# Patient Record
Sex: FEMALE | Race: WHITE | NOT HISPANIC OR LATINO | Employment: FULL TIME | URBAN - METROPOLITAN AREA
[De-identification: names, ages, dates, MRNs, and addresses within clinical notes are randomized per-mention and may not be internally consistent; named-entity substitution may affect disease eponyms.]

---

## 2019-06-07 ENCOUNTER — OFFICE VISIT (OUTPATIENT)
Dept: FAMILY MEDICINE CLINIC | Facility: CLINIC | Age: 24
End: 2019-06-07

## 2019-06-07 VITALS
DIASTOLIC BLOOD PRESSURE: 60 MMHG | HEART RATE: 58 BPM | WEIGHT: 155 LBS | TEMPERATURE: 98.2 F | HEIGHT: 63 IN | SYSTOLIC BLOOD PRESSURE: 110 MMHG | BODY MASS INDEX: 27.46 KG/M2

## 2019-06-07 DIAGNOSIS — Z13.220 SCREENING FOR LIPID DISORDERS: ICD-10-CM

## 2019-06-07 DIAGNOSIS — Z13.1 SCREENING FOR DIABETES MELLITUS: ICD-10-CM

## 2019-06-07 DIAGNOSIS — B37.9 ANTIBIOTIC-INDUCED YEAST INFECTION: ICD-10-CM

## 2019-06-07 DIAGNOSIS — J06.9 UPPER RESPIRATORY TRACT INFECTION, UNSPECIFIED TYPE: ICD-10-CM

## 2019-06-07 DIAGNOSIS — Z13.29 SCREENING FOR THYROID DISORDER: ICD-10-CM

## 2019-06-07 DIAGNOSIS — T36.95XA ANTIBIOTIC-INDUCED YEAST INFECTION: ICD-10-CM

## 2019-06-07 DIAGNOSIS — J30.2 SEASONAL ALLERGIES: ICD-10-CM

## 2019-06-07 DIAGNOSIS — B97.7 HPV IN FEMALE: Primary | ICD-10-CM

## 2019-06-07 DIAGNOSIS — Z13.0 SCREENING FOR DEFICIENCY ANEMIA: ICD-10-CM

## 2019-06-07 PROCEDURE — 99204 OFFICE O/P NEW MOD 45 MIN: CPT | Performed by: NURSE PRACTITIONER

## 2019-06-07 RX ORDER — FLUCONAZOLE 150 MG/1
150 TABLET ORAL ONCE
Qty: 1 TABLET | Refills: 0 | Status: SHIPPED | OUTPATIENT
Start: 2019-06-07 | End: 2019-06-07

## 2019-06-07 RX ORDER — AZITHROMYCIN 250 MG/1
TABLET, FILM COATED ORAL
Qty: 6 TABLET | Refills: 0 | Status: SHIPPED | OUTPATIENT
Start: 2019-06-07 | End: 2019-11-04

## 2019-06-07 RX ORDER — FEXOFENADINE HCL 180 MG/1
180 TABLET ORAL DAILY
Qty: 30 TABLET | Refills: 6 | Status: SHIPPED | OUTPATIENT
Start: 2019-06-07 | End: 2019-11-04

## 2019-06-07 RX ORDER — AZELASTINE 1 MG/ML
2 SPRAY, METERED NASAL 2 TIMES DAILY
Qty: 30 ML | Refills: 11 | Status: SHIPPED | OUTPATIENT
Start: 2019-06-07 | End: 2019-11-04

## 2019-06-07 NOTE — PROGRESS NOTES
Avani Dumont presents today to establish care.    Chief Complaint   Patient presents with   • Cough     She comes in today to be seen for coughing up phlem,sore throat and ear problems   • Earache   • Sore Throat        HPI   Avnai presents to establish care. Recently got new insurance through her job.  Concerns today include cough/seasonal allergies.    cough/seasonal allergies  New.  Worsening.  Symptoms started 3 weeks ago when she was in [place] for the pinkness.  Has been worsening over the past week, now she has an earache bilateral.  Denies fevers or body aches.  Reports a productive cough with increased mucus.  Has tried Sudafed OTC but nothing else.  She does not currently take a daily allergy medication.  She does note that she has seasonal allergies since moving to [place] 5 years ago, had adenoids removed last year due to chronic sinusitis.  Was seeing an allergist but then quit going.      Neck pain  Left posterior neck pain.  Thinks this is a work-related issue.  Describes pain as sharp burning with tingling in her shoulder, worse with movement.  Has not tried any medication for this.  She believes she would like to go to the chiropractor as she has been to the chiropractor before and it helped.  Does not want any medication management at this time or any imaging.    Health Maintenance:  -Sees dentist and opthalmology regularly.  -Sexually active, no use of condoms.  -Birth control: IUD, Kyleena- placed 1 year ago  -Use of seatbelt 100% of the time  -Smoke/CO detector working in home  -Nonsmoker  -Vaccines- needs Hep A- will get at work  -Previously went to  -- will request records    PHQ-2/PHQ-9 Depression Screening 6/7/2019   Little interest or pleasure in doing things 0   Feeling down, depressed, or hopeless 0   Total Score 0       Review of Systems   Constitutional: Positive for fatigue. Negative for chills, diaphoresis and fever.   HENT: Positive for congestion, ear pain, postnasal  "drip, rhinorrhea, sinus pressure and sore throat. Negative for sneezing, tinnitus and voice change.    Eyes: Negative for blurred vision, discharge and visual disturbance.   Respiratory: Positive for cough. Negative for shortness of breath.    Cardiovascular: Negative for chest pain, palpitations and leg swelling.   Gastrointestinal: Negative for constipation, diarrhea, nausea, vomiting, GERD and indigestion.   Musculoskeletal: Negative for neck pain and neck stiffness.   Neurological: Positive for headache. Negative for dizziness and light-headedness.   Psychiatric/Behavioral: Negative for depressed mood and stress. The patient is not nervous/anxious.         Past Medical History:   Diagnosis Date   • Seasonal allergies         Past Surgical History:   Procedure Laterality Date   • ADENOIDECTOMY  2018   • TONSILLECTOMY  2010        Family History   Problem Relation Age of Onset   • Hypertension Paternal Grandfather         Social History     Socioeconomic History   • Marital status: Single     Spouse name: Not on file   • Number of children: Not on file   • Years of education: Not on file   • Highest education level: Not on file   Occupational History     Employer: [employer]     Comment: non profit; [employer]   Tobacco Use   • Smoking status: Current Some Day Smoker   • Smokeless tobacco: Never Used   • Tobacco comment: socially   Substance and Sexual Activity   • Alcohol use: Yes     Comment: socially   • Drug use: Yes     Types: Marijuana     Comment: socially   • Sexual activity: Yes     Partners: Male     Birth control/protection: IUD     Comment: Kyleena; placed 1 year ago        No current outpatient medications on file prior to visit.     No current facility-administered medications on file prior to visit.        No Known Allergies     Visit Vitals  /60   Pulse 58   Temp 98.2 °F (36.8 °C)   Ht 158.8 cm (62.5\")   Wt 70.3 kg (155 lb)   BMI 27.90 kg/m²        Physical Exam "   Constitutional: She is oriented to person, place, and time. Vital signs are normal. She appears well-developed and well-nourished.   HENT:   Head: Normocephalic.   Right Ear: Hearing, tympanic membrane, external ear and ear canal normal. Tympanic membrane is not erythematous.   Left Ear: Hearing, tympanic membrane, external ear and ear canal normal. Tympanic membrane is not erythematous.   Nose: Rhinorrhea and congestion present. Right sinus exhibits no maxillary sinus tenderness and no frontal sinus tenderness. Left sinus exhibits no maxillary sinus tenderness and no frontal sinus tenderness.   Mouth/Throat: Uvula is midline, oropharynx is clear and moist and mucous membranes are normal. No posterior oropharyngeal erythema. No tonsillar exudate.   Post-nasal drainage   Eyes: Conjunctivae and lids are normal. Right eye exhibits no discharge. Left eye exhibits no discharge.   Neck: Normal range of motion. Neck supple.   Cardiovascular: Normal rate, regular rhythm, normal heart sounds and intact distal pulses.   Pulmonary/Chest: Effort normal and breath sounds normal. No respiratory distress. She has no wheezes. She has no rhonchi. She has no rales.   Abdominal: Soft. Bowel sounds are normal.   Musculoskeletal: Normal range of motion.   Lymphadenopathy:        Head (right side): No submental, no submandibular, no tonsillar, no preauricular, no posterior auricular and no occipital adenopathy present.        Head (left side): No submental, no submandibular, no tonsillar, no preauricular, no posterior auricular and no occipital adenopathy present.     She has no cervical adenopathy.   Neurological: She is alert and oriented to person, place, and time. GCS eye subscore is 4. GCS verbal subscore is 5. GCS motor subscore is 6.   Skin: Skin is warm, dry and intact. She is not diaphoretic.   Psychiatric: She has a normal mood and affect. Her speech is normal and behavior is normal. Judgment and thought content normal.    Nursing note and vitals reviewed.       Results for orders placed or performed during the hospital encounter of 02/24/19   POC Urinalysis Dipstick, Multipro (Automated dipstick)   Result Value Ref Range    Color Straw Yellow, Straw, Dark Yellow, Aaliyah    Clarity, UA Cloudy (A) Clear    Glucose, UA Negative Negative, 1000 mg/dL (3+) mg/dL    Bilirubin Negative Negative    Ketones, UA Negative Negative    Specific Gravity  1.025 1.005 - 1.030    Blood, UA Negative Negative    pH, Urine 6.0 5.0 - 8.0    Protein, POC Negative Negative mg/dL    Urobilinogen, UA Normal Normal    Nitrite, UA Positive (A) Negative    Leukocytes Large (3+) (A) Negative        Assessment/Plan  Avani was seen today for cough, earache and sore throat.    Diagnoses and all orders for this visit:    HPV in female  -     Ambulatory Referral to Gynecology  Screening for diabetes mellitus  -     Comprehensive Metabolic Panel; Future  Screening for lipid disorders  -     Lipid Panel; Future  Screening for deficiency anemia  -     CBC & Differential; Future  Screening for thyroid disorder  -     TSH Rfx On Abnormal To Free T4; Future    Upper respiratory tract infection, unspecified type  -     azithromycin (ZITHROMAX) 250 MG tablet; Take 2 tablets the first day, then 1 tablet daily for 4 days.  -Gargle warm salt water to help with sore throat.  -Increase fluid intake.  -Take medication as prescribed.  -Perform good hand hygiene.  -Use of chloraseptic spray or throat lozenges may be used with medications.  -Return to clinic if symptoms persist or worsen.    Seasonal allergies  -     azelastine (ASTELIN) 0.1 % nasal spray; 2 sprays into the nostril(s) as directed by provider 2 (Two) Times a Day. Use in each nostril as directed  -     fexofenadine (ALLEGRA ALLERGY) 180 MG tablet; Take 1 tablet by mouth Daily.    Antibiotic-induced yeast infection  -     fluconazole (DIFLUCAN) 150 MG tablet; Take 1 tablet by mouth 1 (One) Time for 1  dose.        Return in about 6 months (around 12/7/2019) for Annual.

## 2019-11-04 ENCOUNTER — OFFICE VISIT (OUTPATIENT)
Dept: FAMILY MEDICINE CLINIC | Facility: CLINIC | Age: 24
End: 2019-11-04

## 2019-11-04 VITALS
TEMPERATURE: 98.2 F | WEIGHT: 155.4 LBS | BODY MASS INDEX: 27.54 KG/M2 | SYSTOLIC BLOOD PRESSURE: 112 MMHG | OXYGEN SATURATION: 98 % | HEIGHT: 63 IN | DIASTOLIC BLOOD PRESSURE: 80 MMHG | HEART RATE: 69 BPM

## 2019-11-04 DIAGNOSIS — R05.9 COUGH: ICD-10-CM

## 2019-11-04 DIAGNOSIS — J02.9 SORE THROAT: ICD-10-CM

## 2019-11-04 DIAGNOSIS — K12.1 ORAL ULCER: ICD-10-CM

## 2019-11-04 DIAGNOSIS — J06.9 URI, ACUTE: Primary | ICD-10-CM

## 2019-11-04 DIAGNOSIS — J04.0 LARYNGITIS: ICD-10-CM

## 2019-11-04 PROCEDURE — 99213 OFFICE O/P EST LOW 20 MIN: CPT | Performed by: NURSE PRACTITIONER

## 2019-11-04 RX ORDER — CHLORHEXIDINE GLUCONATE 0.12 MG/ML
15 RINSE ORAL 2 TIMES DAILY
Qty: 118 ML | Refills: 1 | Status: SHIPPED | OUTPATIENT
Start: 2019-11-04 | End: 2020-01-07

## 2019-11-04 RX ORDER — DEXTROMETHORPHAN HYDROBROMIDE AND PROMETHAZINE HYDROCHLORIDE 15; 6.25 MG/5ML; MG/5ML
5 SYRUP ORAL 4 TIMES DAILY PRN
Qty: 240 ML | Refills: 0 | Status: SHIPPED | OUTPATIENT
Start: 2019-11-04 | End: 2020-01-07

## 2019-11-04 RX ORDER — AZELASTINE 1 MG/ML
2 SPRAY, METERED NASAL 2 TIMES DAILY
Qty: 30 ML | Refills: 11 | Status: SHIPPED | OUTPATIENT
Start: 2019-11-04 | End: 2020-05-21

## 2019-11-04 NOTE — PROGRESS NOTES
Chief Complaint   Patient presents with   • Laryngitis   • Sore Throat      Avani presents today for loss of voice and sore throat.  [place] and was around her sister who was sick with URI.    URI    This is a new problem. The current episode started in the past 7 days. The problem has been gradually worsening. The maximum temperature recorded prior to her arrival was 100.4 - 100.9 F. The fever has been present for less than 1 day. Associated symptoms include congestion, coughing, headaches, a plugged ear sensation, rhinorrhea, sinus pain and a sore throat. Pertinent negatives include no abdominal pain, chest pain, diarrhea, ear pain, nausea, neck pain, sneezing, swollen glands, vomiting or wheezing. She has tried decongestant and NSAIDs (sudafed, ibuprofen, and vitamin pack) for the symptoms. The treatment provided mild relief.          Review of Systems   Constitutional: Positive for fatigue. Negative for chills, diaphoresis and fever.   HENT: Positive for congestion, postnasal drip, rhinorrhea, sore throat and voice change. Negative for ear pain, sinus pressure, sneezing, swollen glands and tinnitus.    Eyes: Negative for blurred vision, discharge and visual disturbance.   Respiratory: Positive for cough. Negative for chest tightness, shortness of breath and wheezing.    Cardiovascular: Negative for chest pain, palpitations and leg swelling.   Gastrointestinal: Negative for abdominal pain, constipation, diarrhea, nausea, vomiting, GERD and indigestion.   Musculoskeletal: Negative for neck pain and neck stiffness.   Neurological: Positive for headache. Negative for dizziness and light-headedness.   Psychiatric/Behavioral: Negative for depressed mood and stress. The patient is not nervous/anxious.         Current Outpatient Medications on File Prior to Visit   Medication Sig Dispense Refill   • [DISCONTINUED] azelastine (ASTELIN) 0.1 % nasal spray 2 sprays into the nostril(s) as directed by  "provider 2 (Two) Times a Day. Use in each nostril as directed 30 mL 11   • [DISCONTINUED] azithromycin (ZITHROMAX) 250 MG tablet Take 2 tablets the first day, then 1 tablet daily for 4 days. 6 tablet 0   • [DISCONTINUED] fexofenadine (ALLEGRA ALLERGY) 180 MG tablet Take 1 tablet by mouth Daily. 30 tablet 6     No current facility-administered medications on file prior to visit.        No Known Allergies    Past Medical History:   Diagnosis Date   • Seasonal allergies         Past Surgical History:   Procedure Laterality Date   • ADENOIDECTOMY  2018   • TONSILLECTOMY  2010        Family History   Problem Relation Age of Onset   • Hypertension Paternal Grandfather         Social History     Socioeconomic History   • Marital status: Single     Spouse name: Not on file   • Number of children: Not on file   • Years of education: Not on file   • Highest education level: Not on file   Occupational History     Employer: [employer]     Comment: non profit; [employer]   Tobacco Use   • Smoking status: Current Some Day Smoker   • Smokeless tobacco: Never Used   • Tobacco comment: socially   Substance and Sexual Activity   • Alcohol use: Yes     Comment: socially   • Drug use: Yes     Types: Marijuana     Comment: socially   • Sexual activity: Yes     Partners: Male     Birth control/protection: IUD     Comment: Kyleena; placed 1 year ago        Visit Vitals  /80 (BP Location: Right arm, Patient Position: Sitting, Cuff Size: Adult)   Pulse 69   Temp 98.2 °F (36.8 °C) (Temporal)   Ht 158.8 cm (62.5\")   Wt 70.5 kg (155 lb 6.4 oz)   LMP 10/26/2019   SpO2 98%   Breastfeeding? No   BMI 27.97 kg/m²        Physical Exam   Constitutional: She is oriented to person, place, and time. She appears well-developed and well-nourished.   HENT:   Head: Normocephalic.   Right Ear: Tympanic membrane, external ear and ear canal normal. Tympanic membrane is not erythematous.   Left Ear: Tympanic membrane, " external ear and ear canal normal. Tympanic membrane is not erythematous.   Nose: Rhinorrhea and congestion present. Right sinus exhibits no maxillary sinus tenderness and no frontal sinus tenderness. Left sinus exhibits no maxillary sinus tenderness and no frontal sinus tenderness.   Mouth/Throat: Oropharynx is clear and moist and mucous membranes are normal. No posterior oropharyngeal erythema. No tonsillar exudate.       Post-nasal drainage   Eyes: Conjunctivae are normal. Right eye exhibits no discharge. Left eye exhibits no discharge.   Neck: Normal range of motion. Neck supple.   Cardiovascular: Normal rate, regular rhythm and normal heart sounds.   Pulmonary/Chest: Effort normal and breath sounds normal. No respiratory distress. She has no wheezes. She has no rhonchi. She has no rales.   Lymphadenopathy:        Head (right side): No submental, no submandibular, no tonsillar, no preauricular, no posterior auricular and no occipital adenopathy present.        Head (left side): No submental, no submandibular, no tonsillar, no preauricular, no posterior auricular and no occipital adenopathy present.     She has no cervical adenopathy.   Neurological: She is alert and oriented to person, place, and time.   Skin: Skin is warm, dry and intact. She is not diaphoretic.   Psychiatric: She has a normal mood and affect. Her speech is normal and behavior is normal. Judgment and thought content normal.   Nursing note and vitals reviewed.      Results for orders placed or performed during the hospital encounter of 02/24/19   POC Urinalysis Dipstick, Multipro (Automated dipstick)   Result Value Ref Range    Color Straw Yellow, Straw, Dark Yellow, Aaliyah    Clarity, UA Cloudy (A) Clear    Glucose, UA Negative Negative, 1000 mg/dL (3+) mg/dL    Bilirubin Negative Negative    Ketones, UA Negative Negative    Specific Gravity  1.025 1.005 - 1.030    Blood, UA Negative Negative    pH, Urine 6.0 5.0 - 8.0    Protein, POC Negative  Negative mg/dL    Urobilinogen, UA Normal Normal    Nitrite, UA Positive (A) Negative    Leukocytes Large (3+) (A) Negative        Avani was seen today for laryngitis and sore throat.    Diagnoses and all orders for this visit:    URI, acute  Sore throat  Laryngitis  -Recommend OTC Claritin/Allegra/Zyrtec and Mucinex DM 24 hrs  -flonase/nasocort nasal spray  -Gargle warm salt water to help with sore throat.  -Increase fluid intake.  -Take medication as prescribed.  -Perform good hand hygiene.  -Use of chloraseptic spray or throat lozenges may be used with medications.  -Return to clinic if symptoms persist or worsen.  -     azelastine (ASTELIN) 0.1 % nasal spray; 2 sprays into the nostril(s) as directed by provider 2 (Two) Times a Day. Use in each nostril as directed.    Oral ulcer  -     chlorhexidine (PERIDEX) 0.12 % solution; Apply 15 mL to the mouth or throat 2 (Two) Times a Day.    Cough  -     promethazine-dextromethorphan (PROMETHAZINE-DM) 6.25-15 MG/5ML syrup; Take 5 mL by mouth 4 (Four) Times a Day As Needed for Cough.      If sxs persist or worsen- will send antibx.    Return if symptoms worsen or fail to improve.

## 2019-11-25 DIAGNOSIS — Z76.89 REFERRAL OF PATIENT: Primary | ICD-10-CM

## 2020-01-07 ENCOUNTER — OFFICE VISIT (OUTPATIENT)
Dept: OBSTETRICS AND GYNECOLOGY | Facility: CLINIC | Age: 25
End: 2020-01-07

## 2020-01-07 VITALS
SYSTOLIC BLOOD PRESSURE: 118 MMHG | DIASTOLIC BLOOD PRESSURE: 80 MMHG | WEIGHT: 155.6 LBS | HEIGHT: 63 IN | BODY MASS INDEX: 27.57 KG/M2

## 2020-01-07 DIAGNOSIS — B97.7 HPV (HUMAN PAPILLOMA VIRUS) INFECTION: Primary | ICD-10-CM

## 2020-01-07 DIAGNOSIS — Z30.431 SURVEILLANCE OF PREVIOUSLY PRESCRIBED INTRAUTERINE CONTRACEPTIVE DEVICE: ICD-10-CM

## 2020-01-07 DIAGNOSIS — Z01.419 ENCOUNTER FOR GYNECOLOGICAL EXAMINATION WITHOUT ABNORMAL FINDING: ICD-10-CM

## 2020-01-07 PROCEDURE — 99385 PREV VISIT NEW AGE 18-39: CPT | Performed by: OBSTETRICS & GYNECOLOGY

## 2020-01-07 NOTE — PROGRESS NOTES
Chief Complaint   Patient presents with   • Gynecologic Exam   • Abnormal Pap Smear     history of abnormal pap, HPV   • Dysmenorrhea     also premenstrual cramping       Avani Dumont is a 24 y.o. year old  presenting to be seen for her first gynecologic visit with me.  This patient has been followed at the [facility].  She states that over a year ago she had a Pap test that was abnormal.  She underwent colposcopy and is uncertain of the results of her biopsies.  She states that her follow-up Pap test 1 year ago revealed HPV but no dysplasia.  She has had a Kyleena IUD in place for 2 years.  She has scant, cyclic menses.  She has no intermenstrual bleeding.  She has no history of PID.  She denies bowel or urinary symptoms.    SCREENING TESTS    Year 2012  [id number]   Age                         PAP       LGSIL                  HPV high risk       + +                 Mammogram                         HOMERO score                         Breast MRI                         Lipids                         Vitamin D                         Colonoscopy                         DEXA  Frax (hip/any)                         Ovarian Screen                             She exercises regularly: yes.  She wears her seat belt: yes.  She has concerns about domestic violence: no.  She has noticed changes in height: no    GYN screening history:  · Last pap: was done on approximately 2018 and the result was: By history was negative with HPV present..    No Additional Complaints Reported    The following portions of the patient's history were reviewed and updated as appropriate:vital signs and   She  has a past medical history of Abnormal Pap smear of cervix, HPV (human papilloma virus) infection, and Seasonal allergies.  She does not have any pertinent problems on file.  She  has a past surgical history that includes  "Tonsillectomy (2010) and Adenoidectomy (2018).  Her family history includes Heart disease in her mother; Hypertension in her paternal grandfather.  She  reports that she has been smoking electronic cigarette. She has never used smokeless tobacco. She reports that she drinks alcohol. She reports that she has current or past drug history. Drug: Marijuana.  Current Outpatient Medications   Medication Sig Dispense Refill   • azelastine (ASTELIN) 0.1 % nasal spray 2 sprays into the nostril(s) as directed by provider 2 (Two) Times a Day. Use in each nostril as directed 30 mL 11     No current facility-administered medications for this visit.      She has No Known Allergies..    Review of Systems  A comprehensive review of systems was taken.  Constitutional: negative for fever, chills, activity change, appetite change, fatigue and unexpected weight change.  Respiratory: negative  Cardiovascular: negative  Gastrointestinal: negative  Genitourinary:negative  Musculoskeletal:negative  Behavioral/Psych: negative       /80   Ht 158.8 cm (62.5\")   Wt 70.6 kg (155 lb 9.6 oz)   LMP 12/25/2019 (Exact Date)   Breastfeeding No   BMI 28.01 kg/m²     Physical Exam    General:  alert; cooperative; well developed; well nourished   Skin:  No suspicious lesions seen   Thyroid: normal to inspection and palpation   Lungs:  clear to auscultation bilaterally   Heart:  regular rate and rhythm, S1, S2 normal, no murmur, click, rub or gallop   Breasts:  Examined in supine position  Symmetric without masses or skin dimpling  Nipples normal without inversion, lesions or discharge  There are no palpable axillary nodes   Abdomen: soft, non-tender; no masses  no umbilical or inguinal hernias are present  no hepato-splenomegaly   Pelvis: Clinical staff was present for exam  External genitalia:  normal appearance of the external genitalia including Bartholin's and Mossyrock's glands.  Vaginal:  normal pink mucosa without prolapse or " lesions.  Cervix:  normal appearance.  Uterus:  normal size, shape and consistency. anteverted;  Adnexa:  normal bimanual exam of the adnexa.  Rectal:  anus visually normal appearing. recto-vaginal exam unremarkable and confirms findings;                              The IUD string is 1.5 cm from os  Lab Review   No data reviewed    Imaging  No data reviewed         ASSESSMENT  Problems Addressed this Visit        Genitourinary    Surveillance of previously prescribed intrauterine contraceptive device       Other    HPV (human papilloma virus) infection - Primary      Other Visit Diagnoses     Encounter for gynecological examination without abnormal finding        Relevant Orders    Liquid-based Pap Smear, Screening              Substance History:   reports that she has been smoking electronic cigarette. She has never used smokeless tobacco.   reports that she drinks alcohol.   reports that she has current or past drug history. Drug: Marijuana.    Substance use counseling is indicated and I have advised the patient to stop smoking cigarettes for her goal wellbeing and because she has HPV infection.  She indicates that her alcohol intake is social.      PLAN    · Medications prescribed this encounter:  No orders of the defined types were placed in this encounter.  · Pap test done  · I have spent 12 minutes in face-to-face discussion with this patient about the pathophysiology of HPV infection and resulting dysplasia.  I have counseled the patient that there is a good possibility that she has resolved the dysplasia on her own.  I have advised her that HPV is a sexually transmitted infection and that she may desire to use condoms for protection.  · Monthly self breast assessment  · Regular weight-bearing exercise  · Follow up: 12 month(s) unless this Pap test is abnormal  *Please note that portions of this documentation may have been completed with a voice recognition program.  Efforts were made to edit this dictation,  but occasional words may have been mistranscribed.       This note was electronically signed.    ENMANUEL Huber MD  January 7, 2020  4:22 PM

## 2020-01-08 ENCOUNTER — OFFICE VISIT (OUTPATIENT)
Dept: FAMILY MEDICINE CLINIC | Facility: CLINIC | Age: 25
End: 2020-01-08

## 2020-01-08 VITALS
HEIGHT: 63 IN | SYSTOLIC BLOOD PRESSURE: 120 MMHG | DIASTOLIC BLOOD PRESSURE: 68 MMHG | TEMPERATURE: 97.8 F | OXYGEN SATURATION: 98 % | HEART RATE: 83 BPM | WEIGHT: 159 LBS | BODY MASS INDEX: 28.17 KG/M2

## 2020-01-08 DIAGNOSIS — Z13.29 SCREENING FOR THYROID DISORDER: ICD-10-CM

## 2020-01-08 DIAGNOSIS — Z13.0 SCREENING FOR DEFICIENCY ANEMIA: ICD-10-CM

## 2020-01-08 DIAGNOSIS — F90.2 ATTENTION DEFICIT HYPERACTIVITY DISORDER (ADHD), COMBINED TYPE: ICD-10-CM

## 2020-01-08 DIAGNOSIS — Z00.00 ANNUAL PHYSICAL EXAM: Primary | ICD-10-CM

## 2020-01-08 DIAGNOSIS — R19.7 DIARRHEA, UNSPECIFIED TYPE: ICD-10-CM

## 2020-01-08 DIAGNOSIS — Z13.220 SCREENING FOR LIPID DISORDERS: ICD-10-CM

## 2020-01-08 DIAGNOSIS — Z13.1 SCREENING FOR DIABETES MELLITUS: ICD-10-CM

## 2020-01-08 PROCEDURE — 99395 PREV VISIT EST AGE 18-39: CPT | Performed by: NURSE PRACTITIONER

## 2020-01-08 NOTE — PROGRESS NOTES
Chief Complaint   Patient presents with   • Annual Exam       Avani Dumont is a 24 y.o. female and is here for a comprehensive physical exam. The patient reports no problems.    States that she does have diarrhea about twice per week.  This has become normal for her.  No blood in stool.  She is not sure why she has diarrhea.  Believes she could have IBS-D.    Also inquiring about work-up for ADHD.  Was diagnosed with ADHD when she was younger and was on Adderall which helped her focus.  Has been having trouble staying on task and focusing at work.     History:  LMP: Patient's last menstrual period was 2019 (exact date).  Last pap date: 2020  Abnormal pap? Yes; HPV  : 0  Para: 0    Do you take any herbs or supplements that were not prescribed by a doctor? no  Are you taking calcium supplements? no  Are you taking aspirin daily? no      Health Habits:  Dental Exam. up to date  Eye Exam. up to date  Exercise: 4 times/week.  Current exercise activities include: cardiovascular workout on exercise equipment and weightlifting    Health Maintenance   Topic Date Due   • ANNUAL PHYSICAL  1999   • HPV VACCINES (1 - Female 2-dose series) 2007   • TDAP/TD VACCINES (1 - Tdap) 2007   • PNEUMOCOCCAL VACCINE (19-64 MEDIUM RISK) (1 of 1 - PPSV23) 2015   • CHLAMYDIA SCREENING  2017   • PAP SMEAR  2017   • INFLUENZA VACCINE  Completed       PMH, PSH, SocHx, FamHx, Allergies, and Medications: Reviewed and updated in the Visit Navigator.     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   Tobacco Use   • Smoking status: Current Some Day Smoker     Types: Electronic Cigarette   • Smokeless tobacco: Never Used   • Tobacco comment: socially   Substance and Sexual Activity   • Alcohol use: Yes     Comment: socially   • Drug use: Yes     Types: Marijuana     Comment: socially   • Sexual activity: Yes     Partners: Male     Birth control/protection: IUD     Comment: Kyleena; placed 1 year ago     Family History   Problem Relation Age of Onset   • Hypertension Paternal Grandfather    • Heart disease Mother        Review of Systems  Review of Systems   Constitutional: Negative for activity change, chills and fatigue.   HENT: Negative for congestion, postnasal drip, rhinorrhea and sinus pressure.    Eyes: Negative for discharge and visual disturbance.   Respiratory: Negative for chest tightness, shortness of breath and wheezing.    Cardiovascular: Negative for chest pain, palpitations and leg swelling.   Gastrointestinal: Positive for diarrhea. Negative for abdominal pain, blood in stool, constipation, nausea and vomiting.   Endocrine: Negative for cold intolerance and heat intolerance.   Genitourinary: Negative for decreased urine volume, flank pain, frequency, menstrual problem, urgency, vaginal bleeding, vaginal discharge and vaginal pain.   Musculoskeletal: Negative for arthralgias and neck stiffness.   Skin: Negative for color change.   Neurological: Positive for headaches (occassional). Negative for dizziness, weakness and light-headedness.   Psychiatric/Behavioral: Positive for decreased concentration. Negative for agitation. The patient is hyperactive. The patient is not nervous/anxious.        Vitals:    01/08/20 1557   BP: 120/68   Pulse: 83   Temp: 97.8 °F (36.6 °C)   SpO2: 98%       Objective   /68 (BP Location: Left arm, Patient Position: Sitting,  "Cuff Size: Adult)   Pulse 83   Temp 97.8 °F (36.6 °C) (Temporal)   Ht 158.8 cm (62.5\")   Wt 72.1 kg (159 lb)   LMP 12/25/2019 (Exact Date)   SpO2 98%   BMI 28.62 kg/m²     Physical Exam   Constitutional: She is oriented to person, place, and time. Vital signs are normal. She appears well-developed and well-nourished.   HENT:   Head: Normocephalic.   Right Ear: Hearing, tympanic membrane, external ear and ear canal normal.   Left Ear: Hearing, tympanic membrane, external ear and ear canal normal.   Nose: Nose normal.   Mouth/Throat: Uvula is midline, oropharynx is clear and moist and mucous membranes are normal.   Eyes: Pupils are equal, round, and reactive to light. Conjunctivae and lids are normal.   Neck: Normal range of motion. No JVD present. Carotid bruit is not present. No thyromegaly present.   Cardiovascular: Normal rate, regular rhythm, normal heart sounds and intact distal pulses.   Pulmonary/Chest: Effort normal and breath sounds normal. She has no wheezes.   Abdominal: Soft. Normal appearance, normal aorta and bowel sounds are normal. There is no hepatosplenomegaly. There is no tenderness. There is no CVA tenderness, no tenderness at McBurney's point and negative Haq's sign.   Musculoskeletal: Normal range of motion.   Lymphadenopathy:        Head (right side): No submental, no submandibular, no tonsillar, no preauricular, no posterior auricular and no occipital adenopathy present.        Head (left side): No submental, no submandibular, no tonsillar, no preauricular, no posterior auricular and no occipital adenopathy present.     She has no cervical adenopathy.   Neurological: She is alert and oriented to person, place, and time. GCS eye subscore is 4. GCS verbal subscore is 5. GCS motor subscore is 6.   Skin: Skin is warm, dry and intact.   Psychiatric: She has a normal mood and affect. Her speech is normal and behavior is normal. Judgment and thought content normal.   Nursing note and vitals " reviewed.       Assessment/Plan   1. Healthy female exam.    2. Patient Counseling: Including but not Limited to the following, when appropriate:  --Nutrition: Stressed importance of moderation in sodium/caffeine intake, saturated fat and cholesterol, caloric balance, sufficient intake of fresh fruits, vegetables, fiber, calcium, iron, and 1 mg of folate supplement per day (for females capable of pregnancy).  --Discussed the issue of estrogen replacement, calcium supplement, and the daily use of baby aspirin.  --Exercise: Stressed the importance of regular exercise.   --Substance Abuse: Discussed cessation/primary prevention of tobacco, alcohol, or other drug use; driving or other dangerous activities under the influence; availability of treatment for abuse, as indicated based on social history.    --Sexuality: Discussed sexually transmitted diseases, partner selection, use of condoms, avoidance of unintended pregnancy  and contraceptive alternatives.   --Injury prevention: Discussed safety belts, safety helmets, smoke detector, smoking near bedding or upholstery.   --Dental health: Discussed importance of regular tooth brushing, flossing, and dental visits.  --Immunizations reviewed.  --Discussed benefits of colon cancer screening.      3. Discussed the patient's BMI with her.  The BMI is above average; BMI management plan is completed  4. Return in about 3 months (around 4/8/2020) for Follow-up for diarrhea.  5. Age-appropriate Screening Scheduled  6. There are no Patient Instructions on file for this visit.    Assessment/Plan     Avani was seen today for annual exam.    Diagnoses and all orders for this visit:    Annual physical exam    Screening for deficiency anemia  -     CBC & Differential; Future  Screening for diabetes mellitus  -     Comprehensive Metabolic Panel; Future  Screening for lipid disorders  -     Lipid Panel; Future  Screening for thyroid disorder  -     TSH Rfx On Abnormal To Free T4;  Future    Attention deficit hyperactivity disorder (ADHD), combined type  -     Ambulatory Referral to Psychiatry for further evaluation and management.    Diarrhea, unspecified type  -Declined stool studies today.  I have advised that she keep a calendar of how often she is having diarrhea and any associated factors including food.  She verbalized understanding.      I have requested vaccination records from [facility].  She is going to request them from her work as well.

## 2020-01-17 ENCOUNTER — LAB (OUTPATIENT)
Dept: LAB | Facility: HOSPITAL | Age: 25
End: 2020-01-17

## 2020-01-17 DIAGNOSIS — Z13.29 SCREENING FOR THYROID DISORDER: ICD-10-CM

## 2020-01-17 DIAGNOSIS — Z13.0 SCREENING FOR DEFICIENCY ANEMIA: ICD-10-CM

## 2020-01-17 DIAGNOSIS — Z13.220 SCREENING FOR LIPID DISORDERS: ICD-10-CM

## 2020-01-17 DIAGNOSIS — Z13.1 SCREENING FOR DIABETES MELLITUS: ICD-10-CM

## 2020-01-17 LAB
ALBUMIN SERPL-MCNC: 4.7 G/DL (ref 3.5–5.2)
ALBUMIN/GLOB SERPL: 1.8 G/DL
ALP SERPL-CCNC: 37 U/L (ref 39–117)
ALT SERPL W P-5'-P-CCNC: 16 U/L (ref 1–33)
ANION GAP SERPL CALCULATED.3IONS-SCNC: 14.4 MMOL/L (ref 5–15)
AST SERPL-CCNC: 23 U/L (ref 1–32)
BILIRUB SERPL-MCNC: 0.5 MG/DL (ref 0.2–1.2)
BUN BLD-MCNC: 10 MG/DL (ref 6–20)
BUN/CREAT SERPL: 16.9 (ref 7–25)
CALCIUM SPEC-SCNC: 9.6 MG/DL (ref 8.6–10.5)
CHLORIDE SERPL-SCNC: 102 MMOL/L (ref 98–107)
CHOLEST SERPL-MCNC: 133 MG/DL (ref 0–200)
CO2 SERPL-SCNC: 23.6 MMOL/L (ref 22–29)
CREAT BLD-MCNC: 0.59 MG/DL (ref 0.57–1)
DEPRECATED RDW RBC AUTO: 40 FL (ref 37–54)
EOSINOPHIL # BLD MANUAL: 0.12 10*3/MM3 (ref 0–0.4)
EOSINOPHIL NFR BLD MANUAL: 2 % (ref 0.3–6.2)
ERYTHROCYTE [DISTWIDTH] IN BLOOD BY AUTOMATED COUNT: 11.7 % (ref 12.3–15.4)
GFR SERPL CREATININE-BSD FRML MDRD: 125 ML/MIN/1.73
GIANT PLATELETS: ABNORMAL
GLOBULIN UR ELPH-MCNC: 2.6 GM/DL
GLUCOSE BLD-MCNC: 90 MG/DL (ref 65–99)
HCT VFR BLD AUTO: 37.9 % (ref 34–46.6)
HDLC SERPL-MCNC: 47 MG/DL (ref 40–60)
HGB BLD-MCNC: 12.9 G/DL (ref 12–15.9)
LDLC SERPL CALC-MCNC: 73 MG/DL (ref 0–100)
LDLC/HDLC SERPL: 1.55 {RATIO}
LYMPHOCYTES # BLD MANUAL: 3.06 10*3/MM3 (ref 0.7–3.1)
LYMPHOCYTES NFR BLD MANUAL: 12 % (ref 5–12)
LYMPHOCYTES NFR BLD MANUAL: 53 % (ref 19.6–45.3)
MCH RBC QN AUTO: 31.8 PG (ref 26.6–33)
MCHC RBC AUTO-ENTMCNC: 34 G/DL (ref 31.5–35.7)
MCV RBC AUTO: 93.3 FL (ref 79–97)
MONOCYTES # BLD AUTO: 0.69 10*3/MM3 (ref 0.1–0.9)
NEUTROPHILS # BLD AUTO: 1.91 10*3/MM3 (ref 1.7–7)
NEUTROPHILS NFR BLD MANUAL: 33 % (ref 42.7–76)
PLATELET # BLD AUTO: 135 10*3/MM3 (ref 140–450)
PMV BLD AUTO: 14.4 FL (ref 6–12)
POTASSIUM BLD-SCNC: 4.4 MMOL/L (ref 3.5–5.2)
PROT SERPL-MCNC: 7.3 G/DL (ref 6–8.5)
RBC # BLD AUTO: 4.06 10*6/MM3 (ref 3.77–5.28)
RBC MORPH BLD: NORMAL
SMALL PLATELETS BLD QL SMEAR: ADEQUATE
SODIUM BLD-SCNC: 140 MMOL/L (ref 136–145)
TRIGL SERPL-MCNC: 66 MG/DL (ref 0–150)
TSH SERPL DL<=0.05 MIU/L-ACNC: 1.32 UIU/ML (ref 0.27–4.2)
VLDLC SERPL-MCNC: 13.2 MG/DL (ref 5–40)
WBC MORPH BLD: NORMAL
WBC NRBC COR # BLD: 5.78 10*3/MM3 (ref 3.4–10.8)

## 2020-01-17 PROCEDURE — 85025 COMPLETE CBC W/AUTO DIFF WBC: CPT

## 2020-01-17 PROCEDURE — 80053 COMPREHEN METABOLIC PANEL: CPT

## 2020-01-17 PROCEDURE — 80061 LIPID PANEL: CPT

## 2020-01-17 PROCEDURE — 85007 BL SMEAR W/DIFF WBC COUNT: CPT

## 2020-01-17 PROCEDURE — 84443 ASSAY THYROID STIM HORMONE: CPT

## 2020-03-06 ENCOUNTER — OFFICE VISIT (OUTPATIENT)
Dept: FAMILY MEDICINE CLINIC | Facility: CLINIC | Age: 25
End: 2020-03-06

## 2020-03-06 VITALS
DIASTOLIC BLOOD PRESSURE: 60 MMHG | TEMPERATURE: 98.5 F | HEIGHT: 63 IN | OXYGEN SATURATION: 99 % | BODY MASS INDEX: 27.11 KG/M2 | WEIGHT: 153 LBS | SYSTOLIC BLOOD PRESSURE: 122 MMHG | HEART RATE: 68 BPM

## 2020-03-06 DIAGNOSIS — J00 ACUTE NASOPHARYNGITIS: Primary | ICD-10-CM

## 2020-03-06 DIAGNOSIS — R52 BODY ACHES: ICD-10-CM

## 2020-03-06 LAB
EXPIRATION DATE: NORMAL
FLUAV AG NPH QL: NEGATIVE
FLUBV AG NPH QL: NEGATIVE
INTERNAL CONTROL: NORMAL
Lab: NORMAL

## 2020-03-06 PROCEDURE — 99214 OFFICE O/P EST MOD 30 MIN: CPT | Performed by: FAMILY MEDICINE

## 2020-03-06 PROCEDURE — 87804 INFLUENZA ASSAY W/OPTIC: CPT | Performed by: FAMILY MEDICINE

## 2020-03-06 RX ORDER — BROMPHENIRAMINE MALEATE, PSEUDOEPHEDRINE HYDROCHLORIDE, AND DEXTROMETHORPHAN HYDROBROMIDE 2; 30; 10 MG/5ML; MG/5ML; MG/5ML
5 SYRUP ORAL 4 TIMES DAILY PRN
Qty: 118 ML | Refills: 1 | Status: SHIPPED | OUTPATIENT
Start: 2020-03-06 | End: 2020-03-13

## 2020-03-06 NOTE — PROGRESS NOTES
Subjective   Avani Dumont is a 25 y.o. female.     Chief Complaint   Patient presents with   • Generalized Body Aches   • Sore Throat     swollen lymph nodes        History of Present Illness     Avani Dumont presents today for   Chief Complaint   Patient presents with   • Generalized Body Aches   • Sore Throat     swollen lymph nodes        she reports symptoms ongoing for 3 days.  she has tried over-the-counter medications including acetaminophen and antihistamines with minimal improvement.  she has tried rest and fluids. she has noticed associated symptoms of mild congestion as well as a mild subjective fever. she feels as though her symptoms are worsening. she reports some possible sick contacts but none confirmed. She went to a rave in [place] over the weekend and did kiss someone. Not sure what exposure she may have had.    The following portions of the patient's history were reviewed and updated as appropriate: allergies, current medications, past family history, past medical history, past social history, past surgical history and problem list.    Active Ambulatory Problems     Diagnosis Date Noted   • Seasonal allergies    • HPV (human papilloma virus) infection    • Abnormal Pap smear of cervix    • Surveillance of previously prescribed intrauterine contraceptive device 01/07/2020   • Attention deficit hyperactivity disorder (ADHD), combined type 01/08/2020     Resolved Ambulatory Problems     Diagnosis Date Noted   • No Resolved Ambulatory Problems     No Additional Past Medical History     Past Surgical History:   Procedure Laterality Date   • ADENOIDECTOMY  2018   • TONSILLECTOMY  2010     Family History   Problem Relation Age of Onset   • Hypertension Paternal Grandfather    • Heart disease Mother      Social History     Socioeconomic History   • Marital status: Single     Spouse name: Not on file   • Number of children: Not on file   • Years of education: Not on file   • Highest education  "level: Not on file   Occupational History     Employer: [employer]     Comment: non profit; 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   Tobacco Use   • Smoking status: Current Some Day Smoker     Types: Electronic Cigarette   • Smokeless tobacco: Never Used   • Tobacco comment: socially   Substance and Sexual Activity   • Alcohol use: Yes     Comment: socially   • Drug use: Yes     Types: Marijuana     Comment: socially   • Sexual activity: Yes     Partners: Male     Birth control/protection: IUD     Comment: Kyleena; placed 1 year ago       Review of Systems  Review of Systems -   General ROS: positive for  - fever and malaise  negative for - hot flashes, night sweats or weight loss  ENT ROS: negative for - epistaxis, oral lesions, tinnitus or visual changes  Respiratory ROS: negative for - hemoptysis, pleuritic pain, shortness of breath or wheezing  Cardiovascular ROS: no chest pain or dyspnea on exertion    Objective   Blood pressure 122/60, pulse 68, temperature 98.5 °F (36.9 °C), height 158.8 cm (62.52\"), weight 69.4 kg (153 lb), SpO2 99 %, not currently breastfeeding.  Nursing note reviewed  Physical Exam  Const: Mildly ill-appearing but in no acute distress, A&Ox4, Pleasant, Cooperative  Eyes: EOMI, no conjunctivitis  ENT: There is moderate nasal discharge present, nasal congestion noted.  There is slight congestion of the mucous membranes.  There is mild submandibular and posterior cervical lymphadenopathy consistent with symptoms.  Cardiac: Regular rate and rhythm, no cyanosis  Resp: Respiratory rate within normal limits, no increased work of breathing, no audible wheezing or retractions noted.  GI: No distention or ascites  Psych: Appropriate mood and behavior.  Skin: Warm, dry  Procedures  Assessment/Plan     Problem List Items Addressed This Visit     None      Visit Diagnoses     Acute nasopharyngitis    -  Primary    Relevant Medications    brompheniramine-pseudoephedrine-DM 30-2-10 MG/5ML syrup    Body " aches        Relevant Orders    POCT Influenza A/B (Completed)          Patient was encouraged to practice proper hygiene as well as use the symptomatic medications indicated above.  If no better they were encouraged to return to our office if needed.  Zinc lozenges may be effective in preventing the spread of viral upper respiratory infections including the common cold, and they were counseled on family member use and prophylactic use of these medications. she was encouraged to maintain adequate hydration with Pedialyte if possible.  They were cautioned on the risk of viral myocarditis, and encouraged to avoid exercise or significant exertion while febrile or while symptoms extend below the neck.  -POC flu negative today  T&A removed remotely for recurrent strep and flu respectively  -Various unknown exposure at [facility] in [place] over the weekend. No qualifying indication for CoVID-19 testing.  -Concern for mono, patient declines testing. Counseled on possible etiologies and care. Symptomatic management, reasons to return    Patient instructions:  · Attain adequate rest and increase clear fluid intake.   · Use warm salt water gargles, lozenges, honey as a natural antitussive, and/or Delsym syrup as needed for cough.   · Use warm compresses over sinuses for comfort.   · Zinc lozenges may prevent the adhesion of viruses in the respiratory tract to reduce your risk of getting sick.  · Alternate use of Tylenol 500 mg and Ibuprofen 400 mg every 4 hours as needed for headache, body aches, and/or fever reduction    Call or Return to office if symptoms worsen or persist.     Ambulatory progress note signed and attested to by Nilesh Omalley D.O.

## 2020-03-06 NOTE — PATIENT INSTRUCTIONS
"· Attain adequate rest and increase clear fluid intake.   · Use warm salt water gargles, lozenges, honey as a natural antitussive, and/or Delsym syrup as needed for cough.   · Use Mucinex 600 mg twice daily and nasal saline irrigation with \"ocean\" or \"simply saline\" brand sterile nasal spray twice daily.   · Use warm compresses over sinuses for comfort.   · Zinc lozenges may prevent the adhesion of viruses in the respiratory tract to reduce your risk of getting sick.  · Alternate use of Tylenol 500 mg and Ibuprofen 400 mg every 4 hours as needed for headache, body aches, and/or fever reduction    Call or Return to office if symptoms worsen or persist.   "

## 2020-05-21 PROCEDURE — U0003 INFECTIOUS AGENT DETECTION BY NUCLEIC ACID (DNA OR RNA); SEVERE ACUTE RESPIRATORY SYNDROME CORONAVIRUS 2 (SARS-COV-2) (CORONAVIRUS DISEASE [COVID-19]), AMPLIFIED PROBE TECHNIQUE, MAKING USE OF HIGH THROUGHPUT TECHNOLOGIES AS DESCRIBED BY CMS-2020-01-R: HCPCS | Performed by: FAMILY MEDICINE

## 2020-05-24 ENCOUNTER — TELEPHONE (OUTPATIENT)
Dept: URGENT CARE | Facility: CLINIC | Age: 25
End: 2020-05-24

## 2020-05-24 NOTE — TELEPHONE ENCOUNTER
----- Message from Karlie Posadas DNP, APRN sent at 5/24/2020 10:29 AM EDT -----  Notify patient of negative results

## 2020-07-16 ENCOUNTER — OFFICE VISIT (OUTPATIENT)
Dept: OBSTETRICS AND GYNECOLOGY | Facility: CLINIC | Age: 25
End: 2020-07-16

## 2020-07-16 VITALS
HEIGHT: 63 IN | TEMPERATURE: 98.2 F | WEIGHT: 150.6 LBS | SYSTOLIC BLOOD PRESSURE: 108 MMHG | DIASTOLIC BLOOD PRESSURE: 78 MMHG | BODY MASS INDEX: 26.68 KG/M2

## 2020-07-16 DIAGNOSIS — R87.610 ASCUS WITH POSITIVE HIGH RISK HPV CERVICAL: Primary | ICD-10-CM

## 2020-07-16 DIAGNOSIS — R87.810 ASCUS WITH POSITIVE HIGH RISK HPV CERVICAL: Primary | ICD-10-CM

## 2020-07-16 PROCEDURE — 99213 OFFICE O/P EST LOW 20 MIN: CPT | Performed by: OBSTETRICS & GYNECOLOGY

## 2020-07-16 NOTE — PROGRESS NOTES
"   Chief Complaint   Patient presents with   • Follow-up     repeat pap       Avani Dumont is a 25 y.o. year old  presenting to be seen for a PAP in follow-up of a prior abnormal PAP and/or HPV screen.  This patient has been followed at the [facility] where she had an abnormal Pap test read which dysplasia.\"  She apparently had colposcopy with directed biopsies which confirmed dysplasia.  She indicates that her follow-up Pap test there revealed atypical cells.  She had a Pap test here on 2020 read as ASCUS, HPV, non--16/18+.  She has a Kyleena IUD in place and has menses each month that last 10 days but are light.  She is satisfied with this method of contraception.  She is treated for ADD.  She denies bowel or urinary symptoms.    Her last PAP: was done on approximately 2020 and the result was: ASCUS with POSITIVE high risk HPV (non-16/18).  Her last HPV test: was done on approximately 2020 and the result was:  HPV non/16/18 (+)  Uses tobacco currently: no  Sexually active: yes  Current contraception: IUD-Kyleena    No Additional Complaints Reported    The following portions of the patient's history were reviewed and updated as appropriate:vital signs and   She  has a past medical history of Abnormal Pap smear of cervix, HPV (human papilloma virus) infection, and Seasonal allergies.  She does not have any pertinent problems on file.  She  has a past surgical history that includes Tonsillectomy (2010) and Adenoidectomy (2018).  No current outpatient medications on file.     No current facility-administered medications for this visit.      She has No Known Allergies..    Review of Systems  A review of systems was negative.  She denies cough, fever, and shortness of breath.  Constitutional: negative for fever, chills, activity change, appetite change, fatigue and unexpected weight change.  Respiratory: negative  Cardiovascular: negative  Gastrointestinal: " "negative  Genitourinary:negative  Musculoskeletal:negative  Behavioral/Psych: negative        /78   Temp 98.2 °F (36.8 °C)   Ht 160 cm (63\")   Wt 68.3 kg (150 lb 9.6 oz)   LMP 07/10/2020 (Exact Date)   Breastfeeding No   BMI 26.68 kg/m²     Physical Exam    General:  alert; cooperative; well developed; well nourished   Thyroid: normal to inspection and palpation   Lungs- Clear to auscultation,  C-V- RRR with no murmur, rub or gallop   Abdomen: soft, non-tender; no masses  no umbilical or inguinal hernias are present  no hepato-splenomegaly   Pelvis: Clinical staff was present for exam  External genitalia:  normal appearance of the external genitalia including Bartholin's and Fruitland's glands.  Vaginal:  normal pink mucosa without prolapse or lesions.  Cervix:  normal appearance. IUD string present - 1.5 cms in length;  Uterus:  normal size, shape and consistency. anteverted;  Adnexa:  normal bimanual exam of the adnexa.     Lab Review   Pap results    Imaging   No data reviewed    The patient indicates that she does not smoke cigarettes and that she uses alcohol only socially.     ASSESSMENT  Problems Addressed this Visit        Genitourinary    ASCUS with positive high risk HPV cervical - Primary    Relevant Orders    Liquid-based Pap Smear, Screening              PLAN  Pap test done, the patient will be informed of the results  Continue use of Kyleena  Follow up: 6 month(s)   *Please note that portions of this documentation may have been completed with a voice recognition program.  Efforts were made to edit this dictation, but occasional words may have been mistranscribed.  This note was electronically signed.    ENMANUEL Huber MD  July 16, 2020  11:10     "

## 2020-08-24 ENCOUNTER — OFFICE VISIT (OUTPATIENT)
Dept: OBSTETRICS AND GYNECOLOGY | Facility: CLINIC | Age: 25
End: 2020-08-24

## 2020-08-24 VITALS
TEMPERATURE: 98 F | SYSTOLIC BLOOD PRESSURE: 106 MMHG | DIASTOLIC BLOOD PRESSURE: 78 MMHG | WEIGHT: 149.8 LBS | BODY MASS INDEX: 26.54 KG/M2 | HEIGHT: 63 IN

## 2020-08-24 DIAGNOSIS — R87.810 ASCUS WITH POSITIVE HIGH RISK HPV CERVICAL: Primary | ICD-10-CM

## 2020-08-24 DIAGNOSIS — R87.610 ASCUS WITH POSITIVE HIGH RISK HPV CERVICAL: Primary | ICD-10-CM

## 2020-08-24 PROCEDURE — 57455 BIOPSY OF CERVIX W/SCOPE: CPT | Performed by: OBSTETRICS & GYNECOLOGY

## 2020-08-24 NOTE — PROGRESS NOTES
"Date of precedure: 8/24/2020 this patient had a Pap test done on 1/17/2020 read as ASCUS, HPV, non--16/18+.  She returned on 7/16/2020 for a follow-up Pap test which was read as low-grade SARAH, with HPV, non--16/18 positivity.  She returns today for colposcopy with directed biopsies.  I have explained HPV infection to the patient.  I have reviewed with her that 90% of patients who are infected will resolve the infection on their own.  I have explained to her that she is in the 10% category that do not resolve the infection.  I have explained to her that 65% of individuals with mild dysplasia will resolve that on their own.  I have indicated to her that we need to visualize the cervix and possibly obtain biopsies for further evaluation.    /78   Temp 98 °F (36.7 °C)   Ht 160 cm (63\")   Wt 67.9 kg (149 lb 12.8 oz)   LMP 08/10/2020 (Exact Date)   Breastfeeding No   BMI 26.54 kg/m²     No OB History data found    Risks and benefits discussed? yes  All questions answered? yes  Consents given by the patient  Written consent obtained? yes    Local anesthesia used:  no    Pre-op indication: LGSIL - mild dysplasia  Procedure documentation:    The cervix was initially viewed colposcopically through a white filter.  The cervix was next bathed in dilute, acetic acid. A green filter was then used.   The findings were as follows:      The transformation zone was able to be seen adequately.    Findings  Acetowhite noted at 5 o'clock and 12 o'clock  Mosaicism noted at 10 o'clock Physical Exam: Lungs- clear to auscultation; C-V- RRR with no murmur, rub or gallop; Abd.- Soft, non-tender with no organomegaly   The IUD string is 1 cm in visible length        Ectocervical biopsies taken from 5 o'clock and 10 o'clock.  Monsels solution was applied to the biopsy sites..  An ECC was not performed.      Colposcopic Impression: 1. Adequate colposcopy  2. Colposcopic findings are consistent with PAP       Plan: 1) Will base further " treatment on pathology results                                                        2) I have informed the patient that we will be as conservative as possible in her management due to her age and nulliparity                                                        3) I have asked the patient to avoid intercourse for 2 weeks and to insist on the use of condoms with intercourse thereafter    *Please note that portions of this documentation may have been completed with a voice recognition program.  Efforts were made to edit this dictation, but occasional words may have been mistranscribed.     This note was electronically signed.    ENMANUEL Huber MD  August 24, 2020  09:48

## 2020-08-25 DIAGNOSIS — N72 ACUTE CERVICITIS: Primary | ICD-10-CM

## 2020-08-25 RX ORDER — DOXYCYCLINE HYCLATE 100 MG/1
100 CAPSULE ORAL 2 TIMES DAILY
Qty: 14 CAPSULE | Refills: 0 | Status: SHIPPED | OUTPATIENT
Start: 2020-08-25 | End: 2020-09-01

## 2020-12-09 ENCOUNTER — OFFICE VISIT (OUTPATIENT)
Dept: OBSTETRICS AND GYNECOLOGY | Facility: CLINIC | Age: 25
End: 2020-12-09

## 2020-12-09 VITALS
HEIGHT: 63 IN | SYSTOLIC BLOOD PRESSURE: 116 MMHG | BODY MASS INDEX: 28.03 KG/M2 | DIASTOLIC BLOOD PRESSURE: 72 MMHG | WEIGHT: 158.2 LBS

## 2020-12-09 DIAGNOSIS — R87.610 ASCUS WITH POSITIVE HIGH RISK HPV CERVICAL: Primary | ICD-10-CM

## 2020-12-09 DIAGNOSIS — R87.810 ASCUS WITH POSITIVE HIGH RISK HPV CERVICAL: Primary | ICD-10-CM

## 2020-12-09 PROCEDURE — 99213 OFFICE O/P EST LOW 20 MIN: CPT | Performed by: OBSTETRICS & GYNECOLOGY

## 2020-12-09 NOTE — PROGRESS NOTES
Chief Complaint   Patient presents with   • Follow-up     repeat pap       Avani Dumont is a 25 y.o. year old  presenting to be seen for a PAP in follow-up of a prior abnormal PAP and/or HPV screen.  This patient was originally seen in consultation for an abnormal Pap test.  On 2020 her Pap test was read as ASCUS, HPV, non-- 16/18+.  On 2020 her Pap test was read as low-grade SARAH, HPV, non-- 16/18+.  On 2020 she underwent colposcopy with directed biopsies read as LUDY-1.  She returns today for follow-up Pap test.  She has a Kyleena IUD in place and has light prolonged menses.  She denies bowel or urinary symptoms    Her last PAP: was done on approximately 2020 and the result was: LGSIL - mild dysplasia.  Her last HPV test: was done on approximately 2020 and the result was:  HPV non/16/18 (+)  Uses tobacco currently: no  Sexually active: yes  Current contraception: IUD- Kylena    No Additional Complaints Reported    The following portions of the patient's history were reviewed and updated as appropriate:vital signs and   She  has a past medical history of Abnormal Pap smear of cervix, HPV (human papilloma virus) infection, and Seasonal allergies.  She does not have any pertinent problems on file.  She  has a past surgical history that includes Tonsillectomy () and Adenoidectomy (2018).  Her family history includes Heart disease in her mother; Hypertension in her paternal grandfather.  She  reports that she has been smoking electronic cigarette. She has never used smokeless tobacco. She reports current alcohol use. She reports current drug use. Drug: Marijuana.  No current outpatient medications on file.     No current facility-administered medications for this visit.      She has No Known Allergies..    Review of Systems  A review of systems was negative.  She denies cough, fever, shortness of breath, and loss of her sense of taste or smell  Constitutional: negative for fever,  "chills, activity change, appetite change, fatigue and unexpected weight change.  Respiratory: negative  Cardiovascular: negative  Gastrointestinal: negative  Genitourinary:negative  Musculoskeletal:negative  Behavioral/Psych: negative     Counseling/Anticipatory Guidance Discussed: nutrition, family planning/contraception, physical activity, healthy weight and breast cancer and self breast exams     /72   Ht 160 cm (63\")   Wt 71.8 kg (158 lb 3.2 oz)   LMP 12/01/2020 (Exact Date)   Breastfeeding No   BMI 28.02 kg/m²     MEDICALLY INDICATED   Physical Exam    General:  alert; cooperative; well developed; well nourished   Thyroid: normal to inspection and palpation   Abdomen: soft, non-tender; no masses  no umbilical or inguinal hernias are present  no hepato-splenomegaly   Pelvis: Clinical staff was present for exam  External genitalia:  normal appearance of the external genitalia including Bartholin's and [place]'s glands.  Vaginal:  normal pink mucosa without prolapse or lesions.  Cervix:  normal appearance.  Uterus:  normal size, shape and consistency. anteverted; IUD string visible at 1.5 cm length  Adnexa:  normal bimanual exam of the adnexa.     Lab Review   Pap and biopsy results    Imaging   No data reviewed           ASSESSMENT  Problems Addressed this Visit        Genitourinary    ASCUS with positive high risk HPV cervical - Primary    Relevant Orders    Liquid-based Pap Smear, Screening      Diagnoses       Codes Comments    ASCUS with positive high risk HPV cervical    -  Primary ICD-10-CM: R87.610, R87.810  ICD-9-CM: 795.01, 795.05               PLAN   Pap test done  Continue Kyleena IUD use  Follow up: 6 month(s) for AG and Pap test   *Please note that portions of this documentation may have been completed with a voice recognition program.  Efforts were made to edit this dictation, but occasional words may have been mistranscribed.  This note was electronically signed.    ENMANUEL Huber, " MD  December 9, 2020  09:59 EST

## 2021-06-14 ENCOUNTER — OFFICE VISIT (OUTPATIENT)
Dept: OBSTETRICS AND GYNECOLOGY | Facility: CLINIC | Age: 26
End: 2021-06-14

## 2021-06-14 VITALS
SYSTOLIC BLOOD PRESSURE: 108 MMHG | BODY MASS INDEX: 28.53 KG/M2 | DIASTOLIC BLOOD PRESSURE: 70 MMHG | HEIGHT: 63 IN | WEIGHT: 161 LBS

## 2021-06-14 DIAGNOSIS — R87.610 ASCUS WITH POSITIVE HIGH RISK HPV CERVICAL: ICD-10-CM

## 2021-06-14 DIAGNOSIS — Z01.419 ENCOUNTER FOR GYNECOLOGICAL EXAMINATION WITHOUT ABNORMAL FINDING: Primary | ICD-10-CM

## 2021-06-14 DIAGNOSIS — Z00.00 ENCOUNTER FOR ANNUAL PHYSICAL EXAMINATION EXCLUDING GYNECOLOGICAL EXAMINATION IN A PATIENT OLDER THAN 17 YEARS: ICD-10-CM

## 2021-06-14 DIAGNOSIS — Z30.431 SURVEILLANCE OF PREVIOUSLY PRESCRIBED INTRAUTERINE CONTRACEPTIVE DEVICE: ICD-10-CM

## 2021-06-14 DIAGNOSIS — R87.810 ASCUS WITH POSITIVE HIGH RISK HPV CERVICAL: ICD-10-CM

## 2021-06-14 PROCEDURE — 99395 PREV VISIT EST AGE 18-39: CPT | Performed by: OBSTETRICS & GYNECOLOGY

## 2021-06-14 NOTE — PROGRESS NOTES
Chief Complaint   Patient presents with   • Annual Exam       Avani Dumont is a 26 y.o. year old  presenting to be seen for her annual exam.  This patient has a Kyleena IUD in place.  She has spotting for menses but does have rather extended periods lasting 7-10 days.  She does not desire removal of the Kyleena IUD.  She was originally seen with a history of an abnormal Pap test.  Her Pap test on 2020 was read as low-grade SARAH, HPV, non-- 16/18+.  Colposcopy in 2020 revealed LUDY-1 on biopsies.  A follow-up Pap test on 2020 was read as ASCUS, HPV, non-- 16/18+.  She returns today for a follow-up Pap test and annual visit.  She denies bowel or urinary symptoms.    SCREENING TESTS    Year 2012   Age                         PAP         ASCUS                HPV high risk         Non-16/18                Mammogram                         HOMERO score                         Breast MRI                         Lipids                         Vitamin D                         Colonoscopy                         DEXA  Frax (hip/any)                         Ovarian Screen                             She exercises regularly: yes.  She wears her seat belt: yes.  She has concerns about domestic violence: no.  She has noticed changes in height: no    GYN screening history:  · Last pap: was done on approximately 2020 and the result was: ASCUS with POSITIVE high risk HPV (non-16/18)..    No Additional Complaints Reported    The following portions of the patient's history were reviewed and updated as appropriate:vital signs and   She  has a past medical history of Abnormal Pap smear of cervix, HPV (human papilloma virus) infection, and Seasonal allergies.  She does not have any pertinent problems on file.  She  has a past surgical history that includes Tonsillectomy () and Adenoidectomy  "(2018).  Her family history includes Heart disease in her mother; Hypertension in her paternal grandfather.  She  reports that she has been smoking electronic cigarette. She has never used smokeless tobacco. She reports current alcohol use. She reports current drug use. Drug: Marijuana.  No current outpatient medications on file.     No current facility-administered medications for this visit.     She has No Known Allergies..    Review of Systems  A review of systems was taken.  She denies cough, fever, shortness of breath, and loss of her sense of taste or smell  Constitutional: negative for fever, chills, activity change, appetite change, fatigue and unexpected weight change.  Respiratory: negative  Cardiovascular: negative  Gastrointestinal: negative  Genitourinary:negative  Musculoskeletal:negative  Behavioral/Psych: negative     Counseling/Anticipatory Guidance Discussed: nutrition, family planning/contraception, physical activity, healthy weight, immunizations and breast cancer and self breast exams      /70   Ht 160 cm (63\")   Wt 73 kg (161 lb)   LMP 06/13/2021 (Exact Date)   Breastfeeding No   BMI 28.52 kg/m²     BMI reviewed     MEDICALLY INDICATED   Physical Exam    Neuro: alert and oriented to person, place and time    General:  alert; cooperative; well developed; well nourished   Skin:  No suspicious lesions seen   Thyroid: normal to inspection and palpation   Lungs:  breathing is unlabored  clear to auscultation bilaterally   Heart:  regular rate and rhythm, S1, S2 normal, no murmur, click, rub or gallop  normal apical impulse   Breasts:  Examined in supine position  Symmetric without masses or skin dimpling  Nipples normal without inversion, lesions or discharge  There are no palpable axillary nodes  Fibrocystic changes are present both breasts without a discrete mass   Abdomen: soft, non-tender; no masses  no umbilical or inguinal hernias are present  no hepato-splenomegaly   Pelvis: " Clinical staff was present for exam  External genitalia:  normal appearance of the external genitalia including Bartholin's and Brandon's glands.  Vaginal:  normal pink mucosa without prolapse or lesions.  Cervix:  normal appearance. IUD string present - 2 cms in length;  Uterus:  normal size, shape and consistency. retroverted;  Adnexa:  normal bimanual exam of the adnexa.  Rectal:  anus visually normal appearing. recto-vaginal exam unremarkable and confirms findings;     Lab Review   Pap results    Imaging  No data reviewed              ASSESSMENT  Problems Addressed this Visit        Genitourinary and Reproductive     Surveillance of previously prescribed intrauterine contraceptive device    ASCUS with positive high risk HPV cervical      Other Visit Diagnoses     Encounter for gynecological examination without abnormal finding    -  Primary      Diagnoses       Codes Comments    Encounter for gynecological examination without abnormal finding    -  Primary ICD-10-CM: Z01.419  ICD-9-CM: V72.31     Surveillance of previously prescribed intrauterine contraceptive device     ICD-10-CM: Z30.431  ICD-9-CM: V25.42     ASCUS with positive high risk HPV cervical     ICD-10-CM: R87.610, R87.810  ICD-9-CM: 795.01, 795.05               Substance History:   reports that she has been smoking electronic cigarette. She has never used smokeless tobacco.   reports current alcohol use.   reports current drug use. Drug: Marijuana.    Substance use counseling was provided during this visit related to history of positive smokeless tobacco use.  I have advised the patient that nicotine is a cofactor for progression of disease with HPV.      PLAN    · Medications prescribed this encounter:  No orders of the defined types were placed in this encounter.  · Pap test done  · Monthly self breast assessment  · Regular weight-bearing exercise  · Follow up: 6 month(s) for Pap test  *Please note that portions of this documentation may have been  completed with a voice recognition program.  Efforts were made to edit this dictation, but occasional words may have been mistranscribed.       This note was electronically signed.    ENMANUEL Huber MD  June 14, 2021  16:19 EDT

## 2021-06-15 DIAGNOSIS — Z00.00 ENCOUNTER FOR ANNUAL PHYSICAL EXAMINATION EXCLUDING GYNECOLOGICAL EXAMINATION IN A PATIENT OLDER THAN 17 YEARS: Primary | ICD-10-CM

## 2021-10-27 ENCOUNTER — OFFICE VISIT (OUTPATIENT)
Dept: FAMILY MEDICINE CLINIC | Facility: CLINIC | Age: 26
End: 2021-10-27

## 2021-10-27 VITALS
BODY MASS INDEX: 28.92 KG/M2 | HEIGHT: 63 IN | SYSTOLIC BLOOD PRESSURE: 112 MMHG | DIASTOLIC BLOOD PRESSURE: 78 MMHG | OXYGEN SATURATION: 98 % | HEART RATE: 69 BPM | WEIGHT: 163.2 LBS

## 2021-10-27 DIAGNOSIS — J02.9 SORE THROAT: ICD-10-CM

## 2021-10-27 DIAGNOSIS — R09.82 POST-NASAL DRIP: ICD-10-CM

## 2021-10-27 DIAGNOSIS — J30.2 SEASONAL ALLERGIC RHINITIS, UNSPECIFIED TRIGGER: Primary | ICD-10-CM

## 2021-10-27 DIAGNOSIS — R87.610 ATYPICAL SQUAMOUS CELLS OF UNDETERMINED SIGNIFICANCE ON CYTOLOGIC SMEAR OF CERVIX (ASC-US): ICD-10-CM

## 2021-10-27 PROCEDURE — 99213 OFFICE O/P EST LOW 20 MIN: CPT | Performed by: STUDENT IN AN ORGANIZED HEALTH CARE EDUCATION/TRAINING PROGRAM

## 2021-10-27 PROCEDURE — U0004 COV-19 TEST NON-CDC HGH THRU: HCPCS | Performed by: STUDENT IN AN ORGANIZED HEALTH CARE EDUCATION/TRAINING PROGRAM

## 2021-10-27 RX ORDER — LORATADINE 10 MG/1
10 TABLET ORAL DAILY
Qty: 30 TABLET | Refills: 2 | Status: SHIPPED | OUTPATIENT
Start: 2021-10-27 | End: 2022-06-30

## 2021-10-27 RX ORDER — FLUTICASONE PROPIONATE 50 MCG
2 SPRAY, SUSPENSION (ML) NASAL DAILY
Qty: 11.1 ML | Refills: 2 | Status: SHIPPED | OUTPATIENT
Start: 2021-10-27 | End: 2021-11-24

## 2021-10-27 NOTE — PROGRESS NOTES
New Patient Office Visit      Patient Name: Avani Dumont  : 1995   MRN: [id number]   Care Team: Patient Care Team:  Linnea Soto DO as PCP - General (Internal Medicine)  Shine Huber MD (Inactive) as Consulting Physician (Gynecology)    Chief Complaint:    Chief Complaint   Patient presents with   • Facial Pain   • Sore Throat   • Establish Care   • Generalized Body Aches       History of Present Illness: Avani Dumont is a 26 y.o. female with history of allergic rhinitis and ASCUS with HPV who is here today to establish care and discuss sore throat.    Sore throat - symptoms started about 3 days ago and have progressively worsened. Reports sore throat, throat drainage, sinus pressure, sneezing, and bilateral ear fullness. Denies cough, mucus production, fevers, chills, nausea, vomiting, abdominal pain, diarrhea, headache. No known sick contacts. Works in office.     ASCUS +HPV non16/18 - follows with obgyn. Last pap was 2021        Subjective      Review of Systems:   Review of Systems - See HPI    Past Medical History:   Past Medical History:   Diagnosis Date   • Abnormal Pap smear of cervix    • Anxiety    • Depression    • HPV (human papilloma virus) infection    • Seasonal allergies        Past Surgical History:   Past Surgical History:   Procedure Laterality Date   • ADENOIDECTOMY  2018   • TONSILLECTOMY  2010       Family History:   Family History   Problem Relation Age of Onset   • Hypertension Paternal Grandfather    • Heart disease Mother        Social History:   Social History     Socioeconomic History   • Marital status: Single   Tobacco Use   • Smoking status: Current Some Day Smoker     Types: Electronic Cigarette   • Smokeless tobacco: Never Used   • Tobacco comment: socially   Vaping Use   • Vaping Use: Every day   • Substances: Nicotine   • Devices: Disposable   Substance and Sexual Activity   • Alcohol use: Yes     Comment: socially   • Drug use: Yes     Types:  "Marijuana     Comment: socially   • Sexual activity: Yes     Partners: Male     Birth control/protection: I.U.D.     Comment: Kelsea; placed 1 year ago       Tobacco History:   Social History     Tobacco Use   Smoking Status Current Some Day Smoker   • Types: Electronic Cigarette   Smokeless Tobacco Never Used   Tobacco Comment    socially       Medications:     Current Outpatient Medications:   •  fluticasone (Flonase) 50 MCG/ACT nasal spray, 2 sprays into the nostril(s) as directed by provider Daily., Disp: 11.1 mL, Rfl: 2  •  loratadine (Claritin) 10 MG tablet, Take 1 tablet by mouth Daily., Disp: 30 tablet, Rfl: 2    Allergies:   No Known Allergies    Objective     Physical Exam:  Vital Signs:   Vitals:    10/27/21 1034   BP: 112/78   Pulse: 69   SpO2: 98%   Weight: 74 kg (163 lb 3.2 oz)   Height: 160 cm (62.99\")     Body mass index is 28.92 kg/m².     Physical Exam  Vitals reviewed.   HENT:      Right Ear: Tympanic membrane normal. No tenderness. No middle ear effusion. Tympanic membrane is not erythematous or bulging.      Left Ear: Tenderness present. A middle ear effusion is present. Tympanic membrane is not erythematous or bulging.      Nose: Congestion present. No rhinorrhea.      Mouth/Throat:      Mouth: Mucous membranes are moist.      Pharynx: Posterior oropharyngeal erythema present. No oropharyngeal exudate.   Eyes:      Conjunctiva/sclera: Conjunctivae normal.   Cardiovascular:      Rate and Rhythm: Normal rate and regular rhythm.      Pulses: Normal pulses.      Heart sounds: No murmur heard.      Pulmonary:      Effort: No respiratory distress.      Breath sounds: Normal breath sounds. No wheezing or rhonchi.   Abdominal:      Palpations: Abdomen is soft.      Tenderness: There is no abdominal tenderness.   Skin:     General: Skin is warm and dry.   Neurological:      Mental Status: She is alert.   Psychiatric:         Mood and Affect: Mood normal.         Behavior: Behavior normal.         " Judgment: Judgment normal.         Assessment / Plan      Assessment/Plan:   Problems Addressed This Visit  Diagnoses and all orders for this visit:    1. Seasonal allergic rhinitis, unspecified trigger (Primary)  -     loratadine (Claritin) 10 MG tablet; Take 1 tablet by mouth Daily.  Dispense: 30 tablet; Refill: 2  -     fluticasone (Flonase) 50 MCG/ACT nasal spray; 2 sprays into the nostril(s) as directed by provider Daily.  Dispense: 11.1 mL; Refill: 2    2. Post-nasal drip    3. Sore throat  Comments:  Advised patient to self quarantine until covid test results. Encouraged hydration. Advised her to let me know if symptoms worsen or if she develops fever.   Orders:  -     COVID-19 PCR, [facility], NP SWAB IN [facility]SPORT MED[facility]/ORAL SWISH 24-30 HR TAT - Swab, Nasopharynx; Future    4. Atypical squamous cells of undetermined significance on cytologic smear of cervix (ASC-US)  Assessment & Plan:  -LSIL with +HPV non16/18 noted on papsmear 07/2020. S/p colopo  -ASCUS with +HPV non16/18 noted on pap in 12/2020 and again on 06/2021   -Following with OBGYN          Plan of care reviewed with patient at the conclusion of today's visit. Education was provided regarding diagnosis and management.  Patient verbalizes understanding of and agreement with management plan.      Follow Up:   Return if symptoms worsen or fail to improve.          DO SERENE Richardson RD  [facility]  [address]  [facility] [address]  Fax [phone]  Phone [phone]

## 2021-10-27 NOTE — ASSESSMENT & PLAN NOTE
-LSIL with +HPV non16/18 noted on papsmear 07/2020. S/p colopo  -ASCUS with +HPV non16/18 noted on pap in 12/2020 and again on 06/2021   -Following with OBGYN

## 2021-10-28 ENCOUNTER — TELEPHONE (OUTPATIENT)
Dept: FAMILY MEDICINE CLINIC | Facility: CLINIC | Age: 26
End: 2021-10-28

## 2021-10-28 LAB — SARS-COV-2 RNA NOSE QL NAA+PROBE: NOT DETECTED

## 2021-10-28 NOTE — TELEPHONE ENCOUNTER
Pt returned call. Informed of result notes as ordered by provider. Pt voiced understanding. Had no further questions at this time.

## 2021-10-28 NOTE — TELEPHONE ENCOUNTER
Attempted to contact patient to inform of result notes as ordered by provider and as listed below. No answer; Left voicemail for patient to return call with office number given.     ----- Message from Linnea Soto DO sent at 10/28/2021 12:59 PM EDT -----  Please call patient with results. Covid negative.

## 2021-11-24 ENCOUNTER — OFFICE VISIT (OUTPATIENT)
Dept: FAMILY MEDICINE CLINIC | Facility: CLINIC | Age: 26
End: 2021-11-24

## 2021-11-24 VITALS
OXYGEN SATURATION: 98 % | HEIGHT: 63 IN | WEIGHT: 164 LBS | BODY MASS INDEX: 29.06 KG/M2 | DIASTOLIC BLOOD PRESSURE: 80 MMHG | SYSTOLIC BLOOD PRESSURE: 106 MMHG | HEART RATE: 84 BPM

## 2021-11-24 DIAGNOSIS — J30.2 SEASONAL ALLERGIES: Primary | ICD-10-CM

## 2021-11-24 DIAGNOSIS — R05.8 UPPER AIRWAY COUGH SYNDROME: ICD-10-CM

## 2021-11-24 PROCEDURE — 99213 OFFICE O/P EST LOW 20 MIN: CPT | Performed by: STUDENT IN AN ORGANIZED HEALTH CARE EDUCATION/TRAINING PROGRAM

## 2021-11-24 RX ORDER — PSEUDOEPHEDRINE HYDROCHLORIDE 60 MG/1
60 TABLET, FILM COATED ORAL EVERY 4 HOURS PRN
Qty: 24 TABLET | Refills: 0 | Status: SHIPPED | OUTPATIENT
Start: 2021-11-24 | End: 2022-06-30

## 2021-11-24 RX ORDER — TRIAMCINOLONE ACETONIDE 55 UG/1
2 SPRAY, METERED NASAL DAILY
Qty: 10.8 ML | Refills: 5 | Status: SHIPPED | OUTPATIENT
Start: 2021-11-24 | End: 2022-06-30

## 2021-11-24 NOTE — PROGRESS NOTES
Established Office Visit      Patient Name: Avani Dumont  : 1995   MRN: [id number]   Care Team: Patient Care Team:  Linnea Soto DO as PCP - General (Internal Medicine)  Shine Huber MD (Inactive) as Consulting Physician (Gynecology)    Chief Complaint:    Chief Complaint   Patient presents with   • Sore Throat   • Cough   • URI       History of Present Illness: Avani Dumont is a 26 y.o. female who is here today to discuss persistent congestion and throat drainage.     Patient was seen 1 month ago with similar symptoms of sore irritation, drainage, sinus pressure, sneezing, generalized malaise. At that time symptoms were felt to be allergic rhinitis with post nasal drip. She was treated with claritin and flonase to optimize allergies. She has been compliant with claritin and reports this has helped many of her symptoms. She did not tolerate flonase because it made her sore throat worse. Symptoms have improved mostly but not completely resolved. She still has small amount of lingering sinus congestion and dry cough. She denies fever, chills, myalgias, headaches, facial pain, ear pain, chest pain, SOA, wheezing, nighttime cough, or GI symptoms.    Subjective      Review of Systems:   Review of Systems - See HPI    I have reviewed and the following portions of the patient's history were updated as appropriate: past family history, past medical history, past social history, past surgical history and problem list.    Medications:     Current Outpatient Medications:   •  loratadine (Claritin) 10 MG tablet, Take 1 tablet by mouth Daily., Disp: 30 tablet, Rfl: 2  •  pseudoephedrine (SUDAFED) 60 MG tablet, Take 1 tablet by mouth Every 4 (Four) Hours As Needed for Congestion., Disp: 24 tablet, Rfl: 0  •  Triamcinolone Acetonide (Nasacort Allergy 24HR) 55 MCG/ACT nasal inhaler, 2 sprays into the nostril(s) as directed by provider Daily., Disp: 10.8 mL, Rfl: 5    Allergies:   No Known  "Allergies    Objective     Physical Exam:  Vital Signs:   Vitals:    11/24/21 1233   BP: 106/80   Pulse: 84   SpO2: 98%   Weight: 74.4 kg (164 lb)   Height: 160 cm (62.99\")     Body mass index is 29.06 kg/m².     Physical Exam  Vitals reviewed.   Constitutional:       Appearance: Normal appearance.   HENT:      Head: Normocephalic and atraumatic.      Right Ear: Tympanic membrane normal.      Left Ear: Tympanic membrane normal.      Nose: Congestion and rhinorrhea present.      Mouth/Throat:      Mouth: Mucous membranes are moist.      Pharynx: Oropharynx is clear. No oropharyngeal exudate or posterior oropharyngeal erythema.   Eyes:      Conjunctiva/sclera: Conjunctivae normal.   Cardiovascular:      Rate and Rhythm: Normal rate and regular rhythm.      Pulses: Normal pulses.      Heart sounds: Normal heart sounds. No murmur heard.      Pulmonary:      Effort: Pulmonary effort is normal. No respiratory distress.   Abdominal:      General: Abdomen is flat.      Palpations: Abdomen is soft.   Musculoskeletal:      Cervical back: Normal range of motion.   Skin:     General: Skin is warm and dry.   Neurological:      Mental Status: She is alert.   Psychiatric:         Mood and Affect: Mood normal.         Behavior: Behavior normal.         Judgment: Judgment normal.         Assessment / Plan      Assessment/Plan:   Problems Addressed This Visit  Diagnoses and all orders for this visit:    1. Seasonal allergies (Primary)  Comments:  -Improved but still bothersome. Continue Claritin, start pseudoephedrine x 5 days. Add Nasocort daily.   Orders:  -     pseudoephedrine (SUDAFED) 60 MG tablet; Take 1 tablet by mouth Every 4 (Four) Hours As Needed for Congestion.  Dispense: 24 tablet; Refill: 0  -     Triamcinolone Acetonide (Nasacort Allergy 24HR) 55 MCG/ACT nasal inhaler; 2 sprays into the nostril(s) as directed by provider Daily.  Dispense: 10.8 mL; Refill: 5    2. Upper airway cough syndrome  Comments:  Likely due to " allergies vs post viral. If cough persists will further workup with CXR and evaluation for underlying GERD, cough variant asthma, infection, etc  Orders:  -     pseudoephedrine (SUDAFED) 60 MG tablet; Take 1 tablet by mouth Every 4 (Four) Hours As Needed for Congestion.  Dispense: 24 tablet; Refill: 0          Plan of care reviewed with patient at the conclusion of today's visit. Education was provided regarding diagnosis and management.  Patient verbalizes understanding of and agreement with management plan.    Follow Up:   Return if symptoms worsen or fail to improve.        DO SERENE Richardson RD  [facility]  [address]  [facility] [phone]  Fax [phone]  Phone [phone]

## 2021-12-06 ENCOUNTER — OFFICE VISIT (OUTPATIENT)
Dept: OBSTETRICS AND GYNECOLOGY | Facility: CLINIC | Age: 26
End: 2021-12-06

## 2021-12-06 VITALS
SYSTOLIC BLOOD PRESSURE: 122 MMHG | HEIGHT: 63 IN | WEIGHT: 165.6 LBS | BODY MASS INDEX: 29.34 KG/M2 | DIASTOLIC BLOOD PRESSURE: 70 MMHG

## 2021-12-06 DIAGNOSIS — Z87.42 HISTORY OF ABNORMAL CERVICAL PAP SMEAR: Primary | ICD-10-CM

## 2021-12-06 PROCEDURE — 99213 OFFICE O/P EST LOW 20 MIN: CPT | Performed by: NURSE PRACTITIONER

## 2021-12-06 NOTE — PROGRESS NOTES
"Chief Complaint  Avani Dumont is a 26 y.o.  female presenting for Follow-up (6 month repeat pap)    History of Present Illness  Pleasant 27yo nulligravid woman who is here for repeat pap, per Dr. Huber.  She is agreeable with this plan of action.  (She has had persistent LG)  Otherwise, all other ROS negative.    The following portions of the patient's history were reviewed and updated as appropriate: allergies, current medications, past family history, past medical history, past social history, past surgical history and problem list.    No Known Allergies      Current Outpatient Medications:   •  loratadine (Claritin) 10 MG tablet, Take 1 tablet by mouth Daily., Disp: 30 tablet, Rfl: 2  •  pseudoephedrine (SUDAFED) 60 MG tablet, Take 1 tablet by mouth Every 4 (Four) Hours As Needed for Congestion., Disp: 24 tablet, Rfl: 0  •  Triamcinolone Acetonide (Nasacort Allergy 24HR) 55 MCG/ACT nasal inhaler, 2 sprays into the nostril(s) as directed by provider Daily., Disp: 10.8 mL, Rfl: 5    Past Medical History:   Diagnosis Date   • Abnormal Pap smear of cervix    • Anxiety    • Depression    • HPV (human papilloma virus) infection    • Seasonal allergies         Past Surgical History:   Procedure Laterality Date   • ADENOIDECTOMY     • TONSILLECTOMY         Objective  /70   Ht 160 cm (63\")   Wt 75.1 kg (165 lb 9.6 oz)   LMP 11/15/2021 (Approximate)   Breastfeeding No   BMI 29.33 kg/m²     Physical Exam  Exam conducted with a chaperone present.   Constitutional:       Appearance: Normal appearance.   Abdominal:      General: Bowel sounds are normal.      Palpations: Abdomen is soft. There is no mass.      Tenderness: There is no abdominal tenderness.   Genitourinary:     General: Normal vulva.      Vagina: Normal. No erythema.      Cervix: No cervical motion tenderness, discharge, friability or lesion.      Uterus: Normal. Not enlarged and not tender.       Adnexa: Right adnexa normal and left " adnexa normal.        Right: No mass or tenderness.          Left: No mass or tenderness.        Rectum: Normal.      Comments: Pap taken.  There is no leukoplakia & no atypical vessels.  Anus appears wnl.  (No rectal exam performed.)        Assessment/Plan   Diagnoses and all orders for this visit:    1. History of abnormal cervical Pap smear (Primary)        Procedures        No follow-ups on file.    Radha Rivas, APRN  12/06/2021

## 2021-12-29 PROCEDURE — U0004 COV-19 TEST NON-CDC HGH THRU: HCPCS | Performed by: PERSONAL EMERGENCY RESPONSE ATTENDANT

## 2021-12-31 ENCOUNTER — TELEPHONE (OUTPATIENT)
Dept: URGENT CARE | Facility: CLINIC | Age: 26
End: 2021-12-31

## 2021-12-31 NOTE — TELEPHONE ENCOUNTER
12/31/2021 LMTCB, pt has seen positive results on my chart, but called to see how she wa feeling.      Also paper work has already been sent to [facility] department.  jacobo

## 2022-06-30 ENCOUNTER — OFFICE VISIT (OUTPATIENT)
Dept: OBSTETRICS AND GYNECOLOGY | Facility: CLINIC | Age: 27
End: 2022-06-30

## 2022-06-30 VITALS
BODY MASS INDEX: 27.71 KG/M2 | DIASTOLIC BLOOD PRESSURE: 80 MMHG | HEIGHT: 63 IN | WEIGHT: 156.4 LBS | SYSTOLIC BLOOD PRESSURE: 112 MMHG

## 2022-06-30 DIAGNOSIS — Z01.419 ENCOUNTER FOR GYNECOLOGICAL EXAMINATION WITHOUT ABNORMAL FINDING: Primary | ICD-10-CM

## 2022-06-30 DIAGNOSIS — Z30.40 ENCOUNTER FOR SURVEILLANCE OF CONTRACEPTIVES, UNSPECIFIED CONTRACEPTIVE: ICD-10-CM

## 2022-06-30 DIAGNOSIS — Z87.42 HISTORY OF ABNORMAL CERVICAL PAP SMEAR: ICD-10-CM

## 2022-06-30 PROCEDURE — 99395 PREV VISIT EST AGE 18-39: CPT | Performed by: NURSE PRACTITIONER

## 2022-06-30 NOTE — PROGRESS NOTES
"Chief Complaint  Avani Dumont is a 27 y.o.  female presenting for Annual Exam    History of Present Illness  Lemuel is a pleasant 26yo nulligravid woman, here today for repeat pap and annual gyn exam.  States her Kyleena has been in since April or May of 2018, and she is to have a consult visit today about getting it replaced.  She states she has quit smoking cigarettes.      The following portions of the patient's history were reviewed and updated as appropriate: allergies, current medications, past family history, past medical history, past social history, past surgical history and problem list.    No Known Allergies      Current Outpatient Medications:   •  loratadine (Claritin) 10 MG tablet, Take 1 tablet by mouth Daily., Disp: 30 tablet, Rfl: 2  •  methylPREDNISolone (MEDROL) 4 MG dose pack, Take as directed on package instructions., Disp: 21 each, Rfl: 0  •  mometasone (Nasonex) 50 MCG/ACT nasal spray, 2 sprays into the nostril(s) as directed by provider Daily As Needed (sinus congestion) for up to 14 days., Disp: 17 g, Rfl: 0  •  pseudoephedrine (SUDAFED) 60 MG tablet, Take 1 tablet by mouth Every 4 (Four) Hours As Needed for Congestion., Disp: 24 tablet, Rfl: 0  •  Triamcinolone Acetonide (Nasacort Allergy 24HR) 55 MCG/ACT nasal inhaler, 2 sprays into the nostril(s) as directed by provider Daily., Disp: 10.8 mL, Rfl: 5    Past Medical History:   Diagnosis Date   • Abnormal Pap smear of cervix    • Anxiety    • Depression    • HPV (human papilloma virus) infection    • Seasonal allergies         Past Surgical History:   Procedure Laterality Date   • ADENOIDECTOMY     • TONSILLECTOMY         Objective  /80   Ht 160 cm (63\")   Wt 70.9 kg (156 lb 6.4 oz)   LMP 2022 (Approximate) Comment: Kyleena IUD  Breastfeeding No   BMI 27.71 kg/m²     Physical Exam  Exam conducted with a chaperone present.   Constitutional:       Appearance: Normal appearance.   HENT:      Head: " Normocephalic.   Neck:      Thyroid: No thyroid mass or thyromegaly.   Cardiovascular:      Rate and Rhythm: Normal rate and regular rhythm.      Heart sounds: Normal heart sounds.   Pulmonary:      Effort: Pulmonary effort is normal.      Breath sounds: Normal breath sounds.   Chest:   Breasts:      Right: No inverted nipple, mass, nipple discharge, axillary adenopathy or supraclavicular adenopathy.      Left: No inverted nipple, mass, nipple discharge, axillary adenopathy or supraclavicular adenopathy.       Abdominal:      Palpations: Abdomen is soft. There is no mass.      Tenderness: There is no abdominal tenderness.   Genitourinary:     General: Normal vulva.      Labia:         Right: No lesion.         Left: No lesion.       Vagina: Normal. No erythema.      Cervix: No discharge, lesion or erythema.      Uterus: Not enlarged and not tender.       Adnexa:         Right: No mass or tenderness.          Left: No mass or tenderness.        Comments: Anus appears wnl.  No rectal exam performed.  Lymphadenopathy:      Upper Body:      Right upper body: No supraclavicular or axillary adenopathy.      Left upper body: No supraclavicular or axillary adenopathy.   Neurological:      Mental Status: She is alert.         Assessment/Plan   Diagnoses and all orders for this visit:    1. Encounter for gynecological examination without abnormal finding (Primary)    2. History of abnormal cervical Pap smear    3. Encounter for surveillance of contraceptives, unspecified contraceptive        Procedures    19 to 39: Counseling/Anticipatory Guidance Discussed: family planning/contraception and breast cancer and self breast exams    Return in about 1 year (around 6/30/2023) for Annual physical.    Radha Rivas, FE  06/30/2022

## 2022-07-06 ENCOUNTER — OFFICE VISIT (OUTPATIENT)
Dept: OBSTETRICS AND GYNECOLOGY | Facility: CLINIC | Age: 27
End: 2022-07-06

## 2022-07-06 VITALS
WEIGHT: 157 LBS | DIASTOLIC BLOOD PRESSURE: 70 MMHG | BODY MASS INDEX: 27.81 KG/M2 | SYSTOLIC BLOOD PRESSURE: 114 MMHG | RESPIRATION RATE: 16 BRPM

## 2022-07-06 DIAGNOSIS — Z30.430 ENCOUNTER FOR IUD INSERTION: Primary | ICD-10-CM

## 2022-07-06 LAB — REF LAB TEST METHOD: NORMAL

## 2022-07-06 PROCEDURE — 58300 INSERT INTRAUTERINE DEVICE: CPT | Performed by: NURSE PRACTITIONER

## 2022-07-06 PROCEDURE — 58301 REMOVE INTRAUTERINE DEVICE: CPT | Performed by: NURSE PRACTITIONER

## 2022-07-06 NOTE — PROGRESS NOTES
Problem Visit     Patient Name: Avani Dumont  : 1995   MRN: [id number]   Care Team: Patient Care Team:  Linnea Soto DO as PCP - General (Internal Medicine)  Adriane Santillan APRN as Nurse Practitioner (Nurse Practitioner)    Chief Complaint:    Chief Complaint   Patient presents with   • Contraception     Mirena/kyleena iud insertion with kyleena iud removal       HPI: Avani Dumont is a 27 y.o. year old  presenting to be seen for IUD replacement.   Kyleena placed April/May 2018 - light monthly period but last about 7-10 days - spotting mainly   Periods were very heavy prior to Kyleena placement   She would like a Mirena if possible this time   Has done well with Kyleena but would like amenorrhea   Denies questions/concerns re: method or procedure today     AV done 2022 with Sil Perez      /70   Resp 16   Wt 71.2 kg (157 lb)   LMP 2022   Breastfeeding No   BMI 27.81 kg/m²     BMI reviewed: Body mass index is 27.81 kg/m².      Objective     Physical Exam      Neuro: alert and oriented to person, place and time   General:  alert; cooperative; well developed; well nourished   Skin:  Not performed.   Thyroid: not examined   Lungs:  breathing is unlabored   Heart:  Not performed.   Breasts:  Not performed.   Abdomen: Not performed.   Pelvis: Clinical staff was present for exam  External genitalia:  normal appearance of the external genitalia including Bartholin's and [place]'s glands.  :  urethral meatus normal;  Vaginal:  normal pink mucosa without prolapse or lesions.  Cervix:  normal appearance. IUD string present - 2 cms in length;  Uterus:  normal size, shape and consistency.     IUD Removal     With the patient in the dorsal lithotomy position, a speculum was placed into vagina.   The Kyleena strings were grasped with ring forceps. The Kyleena  IUD was removed and shown to the patient.   The patient tolerated procedure well.     IUD Insertion      After having reviewed the contraindications, side effects, and risks and benefits of all major options for reversible contraception, the patient chose the Kyleena  IUD for contraception. The risks of insertion, pelvic infection, and the possibility of failure was discussed. Patient stated that she has a good understanding of all we discussed, and all questions were answered regarding IUD use. Consent form was signed.     With the patient in the dorsal lithotomy position, a speculum was placed in the vagina. The cervix and vagina were prepped with Betadine, the cervix was stabilized with a tenaculum, and the uterus was sounded to a depth of 6 cm. The Kyleena  IUD was placed in the endometrial cavity as directed without complications. The strings were trimmed to approximately 3 cm. Patient tolerated the procedure well.     Assessment / Plan      Assessment  Problems Addressed This Visit    ICD-10-CM ICD-9-CM   1. Encounter for IUD insertion  Z30.430 V25.11       Plan    Kyleena replaced without difficulty   Discussed d/t size of uterus, Kyleena is best option   Discussed periods may continue to become lighter and shorter with longer duration of use with Kyleena   No tampons or intercourse x 3-4 days   Reviewed warning signs to call for   F/u in 6 wks with u/s first to confirm IUD placement then string check   Call with questions/concerns           Follow Up  Return in about 6 weeks (around 8/17/2022) for Recheck - u/s first to confirm IUD placement then string check .  Patient was given instructions and counseling regarding her condition or for health maintenance advice. Please see specific information pulled into the AVS if appropriate.     FE Angeles  July 6, 2022  16:15 EDT

## 2022-09-14 ENCOUNTER — OFFICE VISIT (OUTPATIENT)
Dept: OBSTETRICS AND GYNECOLOGY | Facility: CLINIC | Age: 27
End: 2022-09-14

## 2022-09-14 VITALS
WEIGHT: 160 LBS | SYSTOLIC BLOOD PRESSURE: 116 MMHG | DIASTOLIC BLOOD PRESSURE: 70 MMHG | RESPIRATION RATE: 16 BRPM | BODY MASS INDEX: 28.34 KG/M2

## 2022-09-14 DIAGNOSIS — Z30.431 IUD CHECK UP: Primary | ICD-10-CM

## 2022-09-14 PROCEDURE — 99213 OFFICE O/P EST LOW 20 MIN: CPT | Performed by: NURSE PRACTITIONER

## 2022-09-14 RX ORDER — LEVONORGESTREL 19.5 MG/1
1 INTRAUTERINE DEVICE INTRAUTERINE ONCE
COMMUNITY

## 2022-09-14 NOTE — PROGRESS NOTES
Problem Visit     Patient Name: Avani Dumont  : 1995   MRN: [id number]   Care Team: Patient Care Team:  Linnea Soto DO as PCP - General (Internal Medicine)  Adriane Santillan APRN as Nurse Practitioner (Nurse Practitioner)    Chief Complaint:    Chief Complaint   Patient presents with   • Follow-up       HPI: Avani Dumont is a 27 y.o. year old  presenting to be seen for 6 wk f/u Kyleena replacement.   Kyleena replaced 22   Had a very light and short period in August   Period in Sept was prolonged and heavy - lasted 7-8 days   Intermittent cramping since placement but states this occurred for several months after placement of 1st kyleena   Takes ibuprofen and cramping resolves   No dyspareunia or postcoital bldg     Pelvic u/s today with appropriate placement of IUD   Ovaries WNL     AV done 2022 with Sil Rivas       Subjective      /70   Resp 16   Wt 72.6 kg (160 lb)   LMP 2022   Breastfeeding No   BMI 28.34 kg/m²     BMI reviewed: Body mass index is 28.34 kg/m².      Objective     Physical Exam      Neuro: alert and oriented to person, place and time   General:  alert; cooperative; well developed; well nourished   Skin:  Not performed.   Thyroid: not examined   Lungs:  breathing is unlabored   Heart:  Not performed.   Breasts:  Not performed.   Abdomen: Not performed.   Pelvis: Clinical staff was present for exam  External genitalia:  normal appearance of the external genitalia including Bartholin's and [place]'s glands.  :  urethral meatus normal;  Vaginal:  normal pink mucosa without prolapse or lesions.  Cervix:  normal appearance. IUD string present - 2 cms in length;  Uterus:  normal size, shape and consistency.  Adnexa:  normal bimanual exam of the adnexa.  Rectal:  digital rectal exam not performed; anus visually normal appearing.         Assessment / Plan      Assessment  Problems Addressed This Visit    ICD-10-CM ICD-9-CM   1. IUD check up  Z30.431  V25.42       Plan    Reviewed expected bldg pattern with Kyleena   Periods should return to light flow and short   If heavy periods or AUB develops, she will let me know   Reviewed warning signs to call for including pelvic pain, dyspareunia, or postcoital bldg   AV due June 2023           Follow Up  Return in about 10 months (around 7/3/2023) for Annual physical - Sil's pt .  Patient was given instructions and counseling regarding her condition or for health maintenance advice. Please see specific information pulled into the AVS if appropriate.     Adriane Santillan, APRN  September 14, 2022  15:05 EDT

## 2025-02-12 ENCOUNTER — TELEPHONE (OUTPATIENT)
Dept: OBSTETRICS AND GYNECOLOGY | Facility: CLINIC | Age: 30
End: 2025-02-12

## 2025-02-12 NOTE — TELEPHONE ENCOUNTER
"    Caller: Avani Dumont \"Lemuel\"    Relationship to patient: Self    Best call back number: [phone]    Chief complaint: PT STATES SHE IS HAVING RECURRING YEAST INFECTIONS- VAGINAL ITCHING     Type of visit: GYN FOLLOW-UP     Requested date: ASAP     "

## 2025-02-18 NOTE — PROGRESS NOTES
"Subjective   Chief Complaint   Patient presents with    Follow-up     Pt c/o nodules inside the vagina since 2025.  Pt also c/o intermittent vaginal itching.     Avani Dumont is a 30 y.o. year old .  Patient's last menstrual period was 2025 (within days).  She presents due to concerns for vaginal nodules that have been present over the last month. She states she noticed them when she was going to put in a tampon, and the area just inside the vagina felt \"lumpy.\" She reports a history of genital warts in the past, but never within the vagina. She endorses some vaginal itching, but not in the area of the lumps. She states the itching is intermittent. Denies abnormal rashes or discharge. She uses Native soap, the coconut one, to wash the area sometimes, but mainly just water. She has not had intercourse since September, and reports a new partner. She is open to STD screening today, as well.     The following portions of the patient's history were reviewed and updated as appropriate:current medications, allergies, past medical history, and past surgical history    Social History    Tobacco Use      Smoking status: Some Days        Packs/day: 0.50        Years: 0.5 packs/day for 10.0 years (5.0 ttl pk-yrs)        Types: Electronic Cigarette, Cigarettes        Start date: 2015      Smokeless tobacco: Never      Tobacco comments: socially         Objective   /78 (BP Location: Left arm, Patient Position: Sitting, Cuff Size: Adult)   Ht 160 cm (62.99\")   Wt 73.7 kg (162 lb 6.4 oz)   LMP 2025 (Within Days) Comment: Kyleena  Breastfeeding No   BMI 28.78 kg/m²     General:  well developed; well nourished  no acute distress  mentation appropriate   Lungs:  breathing is unlabored   Heart:  Not performed.   Pelvis: Clinical staff was present for exam  External genitalia:  normal appearance of the external genitalia including Bartholin's and [place]'s glands. Bilateral erythema with " white discharge noted within labial folds  :  urethral meatus normal; urethra normal:  Vaginal:  normal pink mucosa without prolapse or lesions. discharge present -  white and thick;  Cervix:  normal appearance. IUD string present - 2 cms in length;  Uterus:  normal size, shape and consistency.  Adnexa:  normal bimanual exam of the adnexa.  Rectal:  digital rectal exam not performed; anus visually normal appearing.     Lab Review   PAP    Imaging   No data reviewed        Assessment   Vaginal itching  STD screening   IUD in place      Plan   Discussed normal anatomical clinical exam today, and reassurance provided.  Based on exam and discharge, will empirically treat for yeast infection with 2 doses of Diflucan.  Leukorrhea swab collected, and will adjust treatment based on swab results.  Blood work ordered for HIV, hepatitis B&C, and RPR.  The importance of keeping all planned follow-up and taking all medications as prescribed was emphasized.  Follow up for annual exam in June or sooner PRN     New Medications Ordered This Visit   Medications    fluconazole (Diflucan) 200 MG tablet     Sig: Take 1 tablet by mouth Daily for 2 days.     Dispense:  2 tablet     Refill:  0          This note was electronically signed.    Dora Martinez MD  February 19, 2025

## 2025-02-19 ENCOUNTER — OFFICE VISIT (OUTPATIENT)
Dept: OBSTETRICS AND GYNECOLOGY | Facility: CLINIC | Age: 30
End: 2025-02-19
Payer: MEDICAID

## 2025-02-19 ENCOUNTER — LAB (OUTPATIENT)
Dept: LAB | Facility: HOSPITAL | Age: 30
End: 2025-02-19
Payer: MEDICAID

## 2025-02-19 VITALS
BODY MASS INDEX: 28.77 KG/M2 | HEIGHT: 63 IN | WEIGHT: 162.4 LBS | SYSTOLIC BLOOD PRESSURE: 104 MMHG | DIASTOLIC BLOOD PRESSURE: 78 MMHG

## 2025-02-19 DIAGNOSIS — Z11.3 SCREEN FOR STD (SEXUALLY TRANSMITTED DISEASE): ICD-10-CM

## 2025-02-19 DIAGNOSIS — N89.8 VAGINAL ITCHING: Primary | ICD-10-CM

## 2025-02-19 LAB
HBV SURFACE AG SERPL QL IA: NORMAL
HCV AB SER QL: NORMAL
HIV 1+2 AB+HIV1 P24 AG SERPL QL IA: NORMAL

## 2025-02-19 PROCEDURE — G0432 EIA HIV-1/HIV-2 SCREEN: HCPCS

## 2025-02-19 PROCEDURE — 86592 SYPHILIS TEST NON-TREP QUAL: CPT

## 2025-02-19 PROCEDURE — 87340 HEPATITIS B SURFACE AG IA: CPT

## 2025-02-19 PROCEDURE — 86803 HEPATITIS C AB TEST: CPT

## 2025-02-19 RX ORDER — FLUCONAZOLE 200 MG/1
200 TABLET ORAL DAILY
Qty: 2 TABLET | Refills: 0 | Status: SHIPPED | OUTPATIENT
Start: 2025-02-19 | End: 2025-02-21

## 2025-02-20 LAB — RPR SER QL: NORMAL

## 2025-02-24 RX ORDER — METRONIDAZOLE 500 MG/1
500 TABLET ORAL 2 TIMES DAILY
Qty: 14 TABLET | Refills: 0 | Status: SHIPPED | OUTPATIENT
Start: 2025-02-24 | End: 2025-03-03

## 2025-02-25 ENCOUNTER — TELEPHONE (OUTPATIENT)
Dept: OBSTETRICS AND GYNECOLOGY | Facility: CLINIC | Age: 30
End: 2025-02-25
Payer: MEDICAID

## 2025-03-06 NOTE — PROGRESS NOTES
Physical Exam     Patient Name: Avani Dumont  : 1995   MRN: [id number]   Care Team: Patient Care Team:  Linnea Lieberman DO as PCP - General (Internal Medicine)  Adriane Santillan APRN as Nurse Practitioner (Nurse Practitioner)    Chief Complaint:    Chief Complaint   Patient presents with    Annual Exam       History of Present Illness: Avani Dumont is a 30 y.o. female who is presents today for annual healthcare maintenance. Last seen in .     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     She is interested in getting blood work done today.  Not taking any medications currently.     She reports past history of drug use. Previous heavy cocaine use but quit about 70 days ago.   She worries about her cardiovascular health due to hx of substance abuse. She occasionally feels twinge in her chest wall. This stopped when she stopped using cocaine. More often feels these symptoms with anxiety now. Has not noticed any exertional association. No changes in exertional capacity. No chest pressure, dyspnea, palpitations.     Previously struggled with diarrhea but this resolved with stopping drug use.     She drinks alcohols 2-3 days per week, <4 drinks per session     She uses nicotine containing vape daily. Would like to quit eventually.     She struggles with anxiety. Interested in talk therapy.     Family History   Mother - CAD, HTN   Father - HTN  Grandfather - CAD, MI  Grandmother - CVA    Depression: PHQ-2 Depression Screening  PHQ-2 Depression Screening  Little interest or pleasure in doing things? Not at all   Feeling down, depressed, or hopeless? Not at all   PHQ-2 Total Score 0        Health Maintenance   Topic Date Due    BMI FOLLOWUP  2021    COVID-19 Vaccine (2024- season) 2024    INFLUENZA VACCINE  2025 (Originally 2024)    PAP SMEAR  2025    ANNUAL PHYSICAL  2026    TDAP/TD VACCINES (2 - Td or Tdap) 2035    HEPATITIS C SCREENING   Completed    Pneumococcal Vaccine 0-49  Completed       Subjective      Review of Systems:   Review of Systems - See HPI    Past Medical History:   Past Medical History:   Diagnosis Date    Abnormal Pap smear of cervix     Anxiety     Depression     HPV (human papilloma virus) infection     Seasonal allergies        Past Surgical History:   Past Surgical History:   Procedure Laterality Date    ADENOIDECTOMY  2018    TONSILLECTOMY  2010       Family History:   Family History   Problem Relation Age of Onset    Hypertension Paternal Grandfather     Heart disease Mother        Social History:   Social History     Socioeconomic History    Marital status: Single   Tobacco Use    Smoking status: Some Days     Current packs/day: 0.50     Average packs/day: 0.5 packs/day for 10.1 years (5.0 ttl pk-yrs)     Types: Electronic Cigarette, Cigarettes     Start date: 11/24/2015    Smokeless tobacco: Never    Tobacco comments:     socially   Vaping Use    Vaping status: Every Day    Substances: Nicotine    Devices: Disposable   Substance and Sexual Activity    Alcohol use: Yes     Comment: socially    Drug use: Yes     Types: Marijuana     Comment: socially    Sexual activity: Not Currently     Partners: Male     Birth control/protection: I.U.D.     Comment: Kyleena; placed 1 year ago       Tobacco History:   Social History     Tobacco Use   Smoking Status Some Days    Current packs/day: 0.50    Average packs/day: 0.5 packs/day for 10.1 years (5.0 ttl pk-yrs)    Types: Electronic Cigarette, Cigarettes    Start date: 11/24/2015   Smokeless Tobacco Never   Tobacco Comments    socially       Medications:     Current Outpatient Medications:     levonorgestrel (Kyleena) 19.5 MG intrauterine device IUD, To be inserted one time by prescriber. Route intrauterine., Disp: , Rfl:     nicotine (Nicoderm CQ) 14 MG/24HR patch, Place 1 patch on the skin as directed by provider Daily for 14 days. Then 7mg patch, Disp: 14 patch, Rfl: 0    nicotine  "(Nicoderm CQ) 21 MG/24HR patch, Place 1 patch on the skin as directed by provider Daily for 14 days. Then 14mg patch, Disp: 14 patch, Rfl: 0    nicotine (Nicoderm CQ) 7 MG/24HR patch, Place 1 patch on the skin as directed by provider Daily for 14 days., Disp: 14 patch, Rfl: 0    Allergies:   No Known Allergies    Past Medical History, Social History, Family History and Care Team were all reviewed with patient and updated as appropriate.       Objective     Physical Exam:  Vital Signs:   Vitals:    03/07/25 0934   BP: 112/68   Pulse: 75   SpO2: 95%   Weight: 74.1 kg (163 lb 6.4 oz)   Height: 160 cm (62.99\")     Body mass index is 28.95 kg/m².     Physical Exam  Vitals reviewed.   Constitutional:       Appearance: Normal appearance. She is not ill-appearing.   Cardiovascular:      Rate and Rhythm: Normal rate and regular rhythm.      Pulses: Normal pulses.      Heart sounds: Normal heart sounds. No murmur heard.  Pulmonary:      Effort: Pulmonary effort is normal. No respiratory distress.      Breath sounds: Normal breath sounds. No wheezing.   Abdominal:      General: Abdomen is flat.      Palpations: Abdomen is soft.   Skin:     General: Skin is warm and dry.   Neurological:      Mental Status: She is alert.   Psychiatric:         Mood and Affect: Mood normal.         Behavior: Behavior normal.         Judgment: Judgment normal.         ECG 12 Lead    Date/Time: 3/7/2025 10:54 AM  Performed by: Linnea Lieberman DO    Authorized by: Linnea Lieberman DO  Comparison: not compared with previous ECG   Previous ECG: no previous ECG available  Rhythm: sinus rhythm and sinus bradycardia  Rate: bradycardic  QRS axis: normal  Comments: Mild bradycardia (56) otherwise normal sinus, no ischemic changes. Normal EKG for age.          Assessment / Plan      Assessment/Plan:   Problems Addressed This Visit  Diagnoses and all orders for this visit:    1. Annual physical exam (Primary)  -     CBC (No Diff); Future  -     Lipid " Panel; Future  -     Hemoglobin A1c; Future  -     Comprehensive Metabolic Panel; Future  -     TSH; Future    2. History of cocaine abuse    3. Screening for thyroid disorder  -     TSH; Future    4. Screening for cholesterol level  -     Lipid Panel; Future    5. Overweight (BMI 25.0-29.9)  -     CBC (No Diff); Future  -     Lipid Panel; Future  -     Hemoglobin A1c; Future  -     Comprehensive Metabolic Panel; Future  -     TSH; Future    6. Screening for diabetes mellitus  -     Hemoglobin A1c; Future    7. Screening for metabolic disorder  -     Comprehensive Metabolic Panel; Future    8. Atypical chest pain  -     CBC (No Diff); Future  -     Lipid Panel; Future  -     Hemoglobin A1c; Future  -     Comprehensive Metabolic Panel; Future  -     TSH; Future  -     ECG 12 Lead    9. Immunization due  -     Tdap Vaccine => 8yo IM (BOOSTRIX/ADACEL)  -     Pneumococcal Conjugate Vaccine 20-Valent (PCV20)    10. Nicotine dependence, uncomplicated, unspecified nicotine product type  -     nicotine (Nicoderm CQ) 21 MG/24HR patch; Place 1 patch on the skin as directed by provider Daily for 14 days. Then 14mg patch  Dispense: 14 patch; Refill: 0  -     nicotine (Nicoderm CQ) 14 MG/24HR patch; Place 1 patch on the skin as directed by provider Daily for 14 days. Then 7mg patch  Dispense: 14 patch; Refill: 0  -     nicotine (Nicoderm CQ) 7 MG/24HR patch; Place 1 patch on the skin as directed by provider Daily for 14 days.  Dispense: 14 patch; Refill: 0    11. Anxiety  -     Ambulatory Referral to Behavioral Health        Healthcare Maintenance   Vaccines:  Influenza declined  Covid booster encouraged   Tdap and PCV20 today    Cancer Screening  -Pap smear 6/2022 ASCUS with negative HPV - following with GYN Dr. Martinez     Other  -PHQ score 0  -Counseled on importance of maintaining healthy diet and routine exercise. Encouraged 150 min exercise per week.  -Tobacco cessation: Encouraged smoking cessation and counseled on adverse  health risks associated with continued smoking. Discussed options for assistance including NRT, medications, and cessation therapy. Patient is agreeable to quitting smoking. Goal stop date   -Sexual Health: IUD for contraception, managed by GYN. STD screening negative 2/2025.   -Blood pressure is at goal <130/80  -Metabolic screening today    Anxiety - uncontrolled, referred to talk therapy. Not interested in medication at this time.     Encouraged routine eye and dental exams.     Atypical chest pain - likely related to recreational drug use and has since subsided since discontinuing. EKG today is reassuring without ischemic changes. Strongly emphasized importance of avoiding drug use, limiting EtOH use and she expressed understanding and agreement. No indication for further cardiovascular eval at this time but she will let me know if symptoms recur and I would be happy to place cardiology referral for further workup.     Plan of care reviewed with patient at the conclusion of today's visit. Education was provided regarding diagnosis and management.  Patient verbalizes understanding of and agreement with management plan.    Follow Up:   No follow-ups on file.        DO SERENE Asencio RD  [facility]  [address]  [facility] [address]  Fax [phone]  Phone [phone]

## 2025-03-07 ENCOUNTER — LAB (OUTPATIENT)
Dept: LAB | Facility: HOSPITAL | Age: 30
End: 2025-03-07
Payer: MEDICAID

## 2025-03-07 ENCOUNTER — OFFICE VISIT (OUTPATIENT)
Dept: FAMILY MEDICINE CLINIC | Facility: CLINIC | Age: 30
End: 2025-03-07
Payer: MEDICAID

## 2025-03-07 VITALS
OXYGEN SATURATION: 95 % | SYSTOLIC BLOOD PRESSURE: 112 MMHG | HEIGHT: 63 IN | WEIGHT: 163.4 LBS | DIASTOLIC BLOOD PRESSURE: 68 MMHG | HEART RATE: 75 BPM | BODY MASS INDEX: 28.95 KG/M2

## 2025-03-07 DIAGNOSIS — Z13.29 SCREENING FOR THYROID DISORDER: ICD-10-CM

## 2025-03-07 DIAGNOSIS — F41.9 ANXIETY: ICD-10-CM

## 2025-03-07 DIAGNOSIS — Z00.00 ANNUAL PHYSICAL EXAM: Primary | ICD-10-CM

## 2025-03-07 DIAGNOSIS — Z13.1 SCREENING FOR DIABETES MELLITUS: ICD-10-CM

## 2025-03-07 DIAGNOSIS — Z13.220 SCREENING FOR CHOLESTEROL LEVEL: ICD-10-CM

## 2025-03-07 DIAGNOSIS — Z00.00 ANNUAL PHYSICAL EXAM: ICD-10-CM

## 2025-03-07 DIAGNOSIS — F14.11 HISTORY OF COCAINE ABUSE: ICD-10-CM

## 2025-03-07 DIAGNOSIS — R07.89 ATYPICAL CHEST PAIN: ICD-10-CM

## 2025-03-07 DIAGNOSIS — Z13.228 SCREENING FOR METABOLIC DISORDER: ICD-10-CM

## 2025-03-07 DIAGNOSIS — E66.3 OVERWEIGHT (BMI 25.0-29.9): ICD-10-CM

## 2025-03-07 DIAGNOSIS — Z23 IMMUNIZATION DUE: ICD-10-CM

## 2025-03-07 DIAGNOSIS — F17.200 NICOTINE DEPENDENCE, UNCOMPLICATED, UNSPECIFIED NICOTINE PRODUCT TYPE: ICD-10-CM

## 2025-03-07 LAB
ALBUMIN SERPL-MCNC: 4.3 G/DL (ref 3.5–5.2)
ALBUMIN/GLOB SERPL: 1.7 G/DL
ALP SERPL-CCNC: 42 U/L (ref 39–117)
ALT SERPL W P-5'-P-CCNC: 32 U/L (ref 1–33)
ANION GAP SERPL CALCULATED.3IONS-SCNC: 11.3 MMOL/L (ref 5–15)
AST SERPL-CCNC: 31 U/L (ref 1–32)
BILIRUB SERPL-MCNC: <0.2 MG/DL (ref 0–1.2)
BUN SERPL-MCNC: 8 MG/DL (ref 6–20)
BUN/CREAT SERPL: 12.3 (ref 7–25)
CALCIUM SPEC-SCNC: 9.3 MG/DL (ref 8.6–10.5)
CHLORIDE SERPL-SCNC: 103 MMOL/L (ref 98–107)
CHOLEST SERPL-MCNC: 153 MG/DL (ref 0–200)
CO2 SERPL-SCNC: 23.7 MMOL/L (ref 22–29)
CREAT SERPL-MCNC: 0.65 MG/DL (ref 0.57–1)
DEPRECATED RDW RBC AUTO: 41.8 FL (ref 37–54)
EGFRCR SERPLBLD CKD-EPI 2021: 122.4 ML/MIN/1.73
ERYTHROCYTE [DISTWIDTH] IN BLOOD BY AUTOMATED COUNT: 12.2 % (ref 12.3–15.4)
GLOBULIN UR ELPH-MCNC: 2.6 GM/DL
GLUCOSE SERPL-MCNC: 92 MG/DL (ref 65–99)
HBA1C MFR BLD: 5.2 % (ref 4.8–5.6)
HCT VFR BLD AUTO: 40 % (ref 34–46.6)
HDLC SERPL-MCNC: 50 MG/DL (ref 40–60)
HGB BLD-MCNC: 13.4 G/DL (ref 12–15.9)
LDLC SERPL CALC-MCNC: 90 MG/DL (ref 0–100)
LDLC/HDLC SERPL: 1.8 {RATIO}
MCH RBC QN AUTO: 31.2 PG (ref 26.6–33)
MCHC RBC AUTO-ENTMCNC: 33.5 G/DL (ref 31.5–35.7)
MCV RBC AUTO: 93.2 FL (ref 79–97)
PLATELET # BLD AUTO: 143 10*3/MM3 (ref 140–450)
PMV BLD AUTO: 14.2 FL (ref 6–12)
POTASSIUM SERPL-SCNC: 4.6 MMOL/L (ref 3.5–5.2)
PROT SERPL-MCNC: 6.9 G/DL (ref 6–8.5)
RBC # BLD AUTO: 4.29 10*6/MM3 (ref 3.77–5.28)
SODIUM SERPL-SCNC: 138 MMOL/L (ref 136–145)
TRIGL SERPL-MCNC: 64 MG/DL (ref 0–150)
TSH SERPL DL<=0.05 MIU/L-ACNC: 2.06 UIU/ML (ref 0.27–4.2)
VLDLC SERPL-MCNC: 13 MG/DL (ref 5–40)
WBC NRBC COR # BLD AUTO: 5.05 10*3/MM3 (ref 3.4–10.8)

## 2025-03-07 PROCEDURE — 85027 COMPLETE CBC AUTOMATED: CPT

## 2025-03-07 PROCEDURE — 83036 HEMOGLOBIN GLYCOSYLATED A1C: CPT

## 2025-03-07 PROCEDURE — 84443 ASSAY THYROID STIM HORMONE: CPT

## 2025-03-07 PROCEDURE — 80053 COMPREHEN METABOLIC PANEL: CPT

## 2025-03-07 PROCEDURE — 80061 LIPID PANEL: CPT

## 2025-03-07 RX ORDER — NICOTINE 21 MG/24HR
1 PATCH, TRANSDERMAL 24 HOURS TRANSDERMAL EVERY 24 HOURS
Qty: 14 PATCH | Refills: 0 | Status: SHIPPED | OUTPATIENT
Start: 2025-03-07 | End: 2025-03-21

## 2025-03-07 NOTE — LETTER
[facility]  Vaccine Consent Form    Patient Name:  Avani Dumont  Patient :  1995     Vaccine(s) Ordered    Tdap Vaccine => 6yo IM (BOOSTRIX/ADACEL)  Pneumococcal Conjugate Vaccine 20-Valent (PCV20)        Screening Checklist  The following questions should be completed prior to vaccination. If you answer “yes” to any question, it does not necessarily mean you should not be vaccinated. It just means we may need to clarify or ask more questions. If a question is unclear, please ask your healthcare provider to explain it.    Yes No   Any fever or moderate to severe illness today (mild illness and/or antibiotic treatment are not contraindications)?     Do you have a history of a serious reaction to any previous vaccinations, such as anaphylaxis, encephalopathy within 7 days, Guillain-[place] syndrome within 6 weeks, seizure?     Have you received any live vaccine(s) (e.g MMR, DINA) or any other vaccines in the last month (to ensure duplicate doses aren't given)?     Do you have an anaphylactic allergy to latex (DTaP, DTaP-IPV, Hep A, Hep B, MenB, RV, Td, Tdap), baker’s yeast (Hep B, HPV), polysorbates (RSV, nirsevimab, PCV 20, Rotavirrus, Tdap, Shingrix), or gelatin (DINA, MMR)?     Do you have an anaphylactic allergy to neomycin (Rabies, DINA, MMR, IPV, Hep A), polymyxin B (IPV), or streptomycin (IPV)?      Any cancer, leukemia, AIDS, or other immune system disorder? (DINA, MMR, RV)     Do you have a parent, brother, or sister with an immune system problem (if immune competence of vaccine recipient clinically verified, can proceed)? (MMR, DINA)     Any recent steroid treatments for >2 weeks, chemotherapy, or radiation treatment? (DINA, MMR)     Have you received antibody-containing blood transfusions or IVIG in the past 11 months (recommended interval is dependent on product)? (MMR, DINA)     Have you taken antiviral drugs (acyclovir, famciclovir, valacyclovir for DINA) in the last 24 or 48 hours, respectively?     "  Are you pregnant or planning to become pregnant within 1 month? (DINA, MMR, HPV, IPV, MenB, Abrexvy; For Hep B- refer to Engerix-B; For RSV - Abrysvo is indicated for 32-36 weeks of pregnancy from September to January)     For infants, have you ever been told your child has had intussusception or a medical emergency involving obstruction of the intestine (Rotavirus)? If not for an infant, can skip this question.         *Ordering Physicians/APC should be consulted if \"yes\" is checked by the patient or guardian above.  I have received, read, and understand the Vaccine Information Statement (VIS) for each vaccine ordered.  I have considered my or my child's health status as well as the health status of my close contacts.  I have taken the opportunity to discuss my vaccine questions with my or my child's health care provider.   I have requested that the ordered vaccine(s) be given to me or my child.  I understand the benefits and risks of the vaccines.  I understand that I should remain in the clinic for 15 minutes after receiving the vaccine(s).  _________________________________________________________  Signature of Patient or Parent/Legal Guardian ____________________  Date     "

## 2025-06-05 NOTE — PROGRESS NOTES
Subjective   Chief Complaint   Patient presents with    Gynecologic Exam     Annual.     Avani Dumont is a 30 y.o. year old  presenting to be seen for her annual exam. She is doing well, and has no complaints. She does note a possible ingrown hair on her mons, and was able to pop it last week. She uses Dove sensitive skin bar soap in the area.     S/p HPV vaccine series     SEXUAL Hx:  She is currently sexually active.  In the past year there there has been ONE new sexual partner.    Condoms are never used.  She would like to be screened for STD's at today's exam.  Current birth control method: IUD - Kyleena. Placed 22  She is happy with her current method of contraception and does not want to discuss alternative methods of contraception.  MENSTRUAL Hx:  Patient's last menstrual period was 2025 (within days).  In the past 6 months her cycles have been irregular.  Her menstrual flow has been heavy some months and light other months.   Each month on average there are roughly 1 day(s) of very heavy flow.  Intermenstrual bleeding is absent.    Post-coital bleeding is absent.  Dysmenorrhea: none  PMS: none  Her cycles are not a source of concern for her that she wishes to discuss today.  HEALTH Hx:  She exercises regularly: yes. Got a new job at [employer] and is on her feet all the time!   She wears her seat belt: yes.  She has concerns about domestic violence: no.    The following portions of the patient's history were reviewed and updated as appropriate:problem list, current medications, allergies, past family history, past medical history, past social history, and past surgical history.    Social History    Tobacco Use      Smoking status: Some Days        Packs/day: 0.50        Years: 0.5 packs/day for 10.3 years (5.2 ttl pk-yrs)        Types: Electronic Cigarette, Cigarettes        Start date: 2015      Smokeless tobacco: Never      Tobacco comments: socially         Objective   /80  "(BP Location: Left arm, Patient Position: Sitting, Cuff Size: Adult)   Ht 160 cm (62.99\")   Wt 70.8 kg (156 lb)   LMP 05/16/2025 (Within Days) Comment: Kyleena  Breastfeeding No   BMI 27.64 kg/m²     General:  well developed; well nourished  no acute distress  mentation appropriate   Breasts:  Examined in supine position  Symmetric without masses or skin dimpling  Nipples normal without inversion, lesions or discharge  There are no palpable axillary nodes  Fibrocystic changes are present both breasts without a discrete mass   Pelvis: Clinical staff was present for exam  External genitalia:  normal appearance of the external genitalia including Bartholin's and Sidney's glands. Well healed ingrown hair on the right superior mons  :  urethral meatus normal; urethra normal:  Vaginal:  normal pink mucosa without prolapse or lesions.  Cervix:  normal appearance. ectropian present; IUD string present - 0.5 cms in length;  Uterus:  normal size, shape and consistency.  Adnexa:  normal bimanual exam of the adnexa.  Rectal:  digital rectal exam not performed; anus visually normal appearing.        Assessment   Annual GYN exam  STD screening   IUD in place      Plan   Pap with STD screening was done today.  If she does not receive the results of the Pap within 2 weeks  time, she was instructed to call to find out the results.  I explained to Avani that the recommendations for Pap smear interval in a low risk patient's has lengthened to 3 years time.  I encouraged her to be seen yearly for a full physical exam including breast and pelvic exam even during the off years when PAP's will not be performed.   Continue Kyleena IUD until 7/1/2027.  The importance of keeping all planned follow-up and taking all medications as prescribed was emphasized.  Follow up for annual exam in 1 year or sooner PRN      No orders of the defined types were placed in this encounter.         This note was electronically signed.    Dora Martinez, " MD   June 6, 2025

## 2025-06-06 ENCOUNTER — OFFICE VISIT (OUTPATIENT)
Dept: OBSTETRICS AND GYNECOLOGY | Facility: CLINIC | Age: 30
End: 2025-06-06
Payer: MEDICAID

## 2025-06-06 VITALS
SYSTOLIC BLOOD PRESSURE: 120 MMHG | DIASTOLIC BLOOD PRESSURE: 80 MMHG | BODY MASS INDEX: 27.64 KG/M2 | HEIGHT: 63 IN | WEIGHT: 156 LBS

## 2025-06-06 DIAGNOSIS — Z01.419 WOMEN'S ANNUAL ROUTINE GYNECOLOGICAL EXAMINATION: Primary | ICD-10-CM

## 2025-06-06 DIAGNOSIS — Z97.5 IUD (INTRAUTERINE DEVICE) IN PLACE: ICD-10-CM

## 2025-06-06 DIAGNOSIS — Z11.3 SCREENING EXAMINATION FOR STD (SEXUALLY TRANSMITTED DISEASE): ICD-10-CM

## 2025-06-10 LAB — REF LAB TEST METHOD: NORMAL
